# Patient Record
Sex: FEMALE | Race: WHITE | Employment: OTHER | URBAN - METROPOLITAN AREA
[De-identification: names, ages, dates, MRNs, and addresses within clinical notes are randomized per-mention and may not be internally consistent; named-entity substitution may affect disease eponyms.]

---

## 2021-06-02 ENCOUNTER — APPOINTMENT (OUTPATIENT)
Dept: CT IMAGING | Age: 86
End: 2021-06-02
Attending: EMERGENCY MEDICINE
Payer: MEDICARE

## 2021-06-02 ENCOUNTER — HOSPITAL ENCOUNTER (EMERGENCY)
Age: 86
Discharge: HOME | End: 2021-06-02
Attending: EMERGENCY MEDICINE
Payer: MEDICARE

## 2021-06-02 VITALS
HEART RATE: 79 BPM | WEIGHT: 181.44 LBS | HEIGHT: 62 IN | OXYGEN SATURATION: 95 % | RESPIRATION RATE: 19 BRPM | TEMPERATURE: 98.5 F | BODY MASS INDEX: 33.39 KG/M2 | SYSTOLIC BLOOD PRESSURE: 180 MMHG | DIASTOLIC BLOOD PRESSURE: 91 MMHG
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
## 2021-06-02 PROCEDURE — 99284 EMERGENCY DEPT VISIT MOD MDM: CPT

## 2021-06-02 PROCEDURE — 70450 CT HEAD/BRAIN W/O DYE: CPT

## 2021-06-02 NOTE — ED TRIAGE NOTES
TRIAGE NOTE: Patient arrived via EMS with c/o witnessed fall, no LOC. Patient is at baseline. Hx of dementia.

## 2021-06-02 NOTE — ED PROVIDER NOTES
40-year-old female history of hypertension, dementia presents by EMS from nursing home after a witnessed fall today. She had a ground-level slip and fall. She is reportedly at her baseline mental status with dementia. She is pleasantly confused and unable to give me any history. The history is provided by the EMS personnel and medical records. Fall  The accident occurred less than 1 hour ago. The fall occurred while walking. She fell from a height of ground level. There was no blood loss. The patient is experiencing no pain. She was ambulatory at the scene. There was no entrapment after the fall. There was no drug use involved in the accident. There was no alcohol use involved in the accident. The risk factors include dementia.          Past Medical History:   Diagnosis Date    DJD (degenerative joint disease), multiple sites 2009    Essential hypertension, benign 2009    Osteopenia 2009    Other ill-defined conditions(799.89)     tooth extracted due to infection- now resolved    Pure hypercholesterolemia 2009       Past Surgical History:   Procedure Laterality Date    ENDOSCOPY, COLON, DIAGNOSTIC  2009 Dr. Aurora Dominguez     Diverticulosis repeat 5 yrs    HX  SECTION      x 2    HX GYN      hysterecotomy,    HX HEENT      T&A         Family History:   Problem Relation Age of Onset    Heart Disease Father        Social History     Socioeconomic History    Marital status:      Spouse name: Not on file    Number of children: Not on file    Years of education: Not on file    Highest education level: Not on file   Occupational History    Not on file   Tobacco Use    Smoking status: Never Smoker    Smokeless tobacco: Never Used   Substance and Sexual Activity    Alcohol use: No    Drug use: No    Sexual activity: Not on file   Other Topics Concern    Not on file   Social History Narrative    Not on file     Social Determinants of Health     Financial Resource Strain:     Difficulty of Paying Living Expenses:    Food Insecurity:     Worried About Running Out of Food in the Last Year:     [address] in the Last Year:    Transportation Needs:     Lack of Transportation (Medical):  Lack of Transportation (Non-Medical):    Physical Activity:     Days of Exercise per Week:     Minutes of Exercise per Session:    Stress:     Feeling of Stress :    Social Connections:     Frequency of Communication with Friends and Family:     Frequency of Social Gatherings with Friends and Family:     Attends Pentec[employer]ism Services:     Active Member of Clubs or Organizations:     Attends Club or Organization Meetings:     Marital Status:    Intimate Partner Violence:     Fear of Current or Ex-Partner:     Emotionally Abused:     Physically Abused:     Sexually Abused: ALLERGIES: Tramadol    Review of Systems   Unable to perform ROS: Dementia       There were no vitals filed for this visit. Physical Exam  Vitals and nursing note reviewed. Constitutional:       General: She is not in acute distress. Appearance: Normal appearance. She is well-developed. She is not ill-appearing, toxic-appearing or diaphoretic. HENT:      Head: Normocephalic and atraumatic. Nose: Nose normal.      Mouth/Throat:      Mouth: Mucous membranes are moist.      Pharynx: Oropharynx is clear. Eyes:      Extraocular Movements: Extraocular movements intact. Conjunctiva/sclera: Conjunctivae normal.      Pupils: Pupils are equal, round, and reactive to light. Cardiovascular:      Rate and Rhythm: Normal rate and regular rhythm. Pulses: Normal pulses. Heart sounds: Normal heart sounds. Pulmonary:      Effort: Pulmonary effort is normal. No respiratory distress. Breath sounds: Normal breath sounds. No wheezing. Chest:      Chest wall: No tenderness. Abdominal:      General: Abdomen is flat. There is no distension. Palpations: Abdomen is soft. Tenderness: There is no abdominal tenderness. There is no guarding or rebound. Musculoskeletal:         General: No swelling, tenderness, deformity or signs of injury. Normal range of motion. Cervical back: Normal range of motion and neck supple. No rigidity. No muscular tenderness. Right lower leg: No edema. Left lower leg: No edema. Skin:     General: Skin is warm and dry. Capillary Refill: Capillary refill takes less than 2 seconds. Neurological:      General: No focal deficit present. Mental Status: She is alert. Mental status is at baseline. She is disoriented. Psychiatric:         Mood and Affect: Mood normal.         Behavior: Behavior normal.          MDM  Number of Diagnoses or Management Options  Diagnosis management comments: 77-year-old female presents as above after fall. No evidence of acute injury on exam or CT. Plan to discharge back to her nursing facility.        Amount and/or Complexity of Data Reviewed  Tests in the radiology section of CPT®: reviewed           Procedures

## 2021-06-02 NOTE — DISCHARGE INSTRUCTIONS
Thank you for allowing us to provide you with medical care today. We realize that you have many choices for your emergency care needs. We thank you for choosing [facility]. Please choose us in the future for any continued health care needs. We hope we addressed all of your medical concerns. We strive to provide excellent quality care in the Emergency Department. Anything less than excellent does not meet our expectations. The exam and treatment you received in the Emergency Department were for an emergent problem and are not intended as complete care. It is important that you follow up with a doctor, nurse practitioner, or physician's assistant for ongoing care. If your symptoms worsen or you do not improve as expected and you are unable to reach your usual health care provider, you should return to the Emergency Department. We are available 24 hours a day. Take this sheet with you when you go to your follow-up visit. If you have any problem arranging the follow-up visit, contact the Emergency Department immediately. Make an appointment your family doctor for follow up of this visit. Return to the ER if you are unable to be seen in a timely manner.

## 2021-11-24 ENCOUNTER — HOSPICE ADMISSION (OUTPATIENT)
Dept: HOSPICE | Facility: HOSPICE | Age: 86
End: 2021-11-24
Payer: MEDICARE

## 2021-11-25 ENCOUNTER — HOME CARE VISIT (OUTPATIENT)
Dept: HOSPICE | Facility: HOSPICE | Age: 86
End: 2021-11-25
Payer: MEDICARE

## 2021-11-25 ENCOUNTER — HOME CARE VISIT (OUTPATIENT)
Dept: SCHEDULING | Facility: HOME HEALTH | Age: 86
End: 2021-11-25
Payer: MEDICARE

## 2021-11-25 VITALS
BODY MASS INDEX: 31.05 KG/M2 | DIASTOLIC BLOOD PRESSURE: 50 MMHG | RESPIRATION RATE: 20 BRPM | HEART RATE: 110 BPM | WEIGHT: 169.75 LBS | SYSTOLIC BLOOD PRESSURE: 88 MMHG | OXYGEN SATURATION: 94 %

## 2021-11-25 PROCEDURE — G0299 HHS/HOSPICE OF RN EA 15 MIN: HCPCS

## 2021-11-25 PROCEDURE — HOSPICE MEDICATION HC HH HOSPICE MEDICATION

## 2021-11-25 PROCEDURE — 0651 HSPC ROUTINE HOME CARE

## 2021-11-25 PROCEDURE — 3336500001 HSPC ELECTION

## 2021-11-25 NOTE — HOSPICE
Patient Name: Octavio Mahoney    Date of Birth: [birth year]  Age: 80                                                         Principle Hospice Diagnosis: Alzheimer's   Diagnoses RELATED to the terminal prognosis: dysphagia  Other Diagnoses: recent urosepsis, a.fib, respiratory failure with hypoxia, hypertension      Date of Hospice Admission: 21  Hospice Attending Elected by Patient:  Berkeleydigna Shipman   spoke by phone/he will cover  Primary Care Physician:     Admitting RN: MARLEY  Nurse CM: HARDIK  : HARDIK  :    HARDIK      Durable DNR:     Home:    Direct Observation: Pt recently hospitalized with urosepsis. Continued functional decline, resp. failure, poor po intake and dysphagia and family declines any artificial food or fluid. Three sons present for admission, one lives locally, Pedro [place], two are out of state. Reviewed hospice philosophy, goals of care, symptom management. Long discussion about EOL signs. Consents were done via docusign by another team member. Pt is PPS 30, PAINAD zero, FAST 7E. Resp- increased work of breathing. Saturation 94% on 3 liters, loose, moist cough. CV-BP low, 88 SBP, skin warm to touch. Diminished pulses. GI/- Incontinent of bowel and bladder. Skin- no areas of concern, a few scattered bruises. Pt is bed fast, total care. Other- collaborated with HMD, Dr. Dora Serna, and nurse Marvin Ogden at facility. All po meds d/c due to dysphagia, pocketing. Comfort meds ordered and will come via [employer]. Electronic scripts to D.     ER Visits/ Hospitalizations in past year: one recent admit to [facility]   Onset Date of Hospice Diagnosis: 21  Summary of Disease Progression Leading to Hospice Diagnosis: from recent inpatient stay H/P:             Patients will be considered to be in the terminal stage of dementia (life expectancy of six months or less) if they meet the following criteria. Patients with dementia should show all the following characteristics:    _____7 E___  1. Stage seven or beyond according to the Functional Assessment Staging Scale;  ____x____  2. Unable to ambulate without assistance;  ____x____  3. Unable to dress without assistance;  ___x_____  4. Unable to bathe without assistance;  ___x_____  5. Urinary and fecal incontinence, intermittent or constant;  ___x_____  6. No consistently meaningful verbal communication: stereotypical phrases only or the ability             to speak is limited to six or fewer intelligible words. Patients should have had one of the following within the past 12 months:    ________  1. Aspiration pneumonia;  ____x____  2. Pyelonephritis or other upper urinary tract infection;  ____x____  3. Septicemia;  ________  4. Decubitus ulcers, multiple, stage 3-4;  ________  5. Fever, recurrent after antibiotics;  ____x____  6. Inability to maintain sufficient fluid and calorie intake with 10% weight loss during the            previous six months or serum albumin Note: This section is specific for Alzheimer's Disease            and related disorders, and is not appropriate for other types of dementia, such as multi-           infarct dementia. SPIRITUAL/Social/Emotional:              Patient graduated from [employer]. 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 Very involved in her Hindu life. Raised 3 sons. All sons present today. Psych/ Social/ Emotional Issues Identified:   Pt is a long term resident of [facility]. Lived in [facility] for 5 years then moved to memory care 2.5 years ago. Son Alex Mishra is MPOA. Dr. Giovana Leonard, contacted, agrees to serve as attending provider for hospice and provided verbal certification of terminal illness with prognosis of 6 months or less life expectancy. Orders for hospice admission, medications and plan of treatment received. Medication reconciliation completed.     Currently this patient has:  Supplemental O2:   3 liters          MEDS:  I have reviewed the patient's medication list with MD and identified the following:  Nonformulary medications:   Unrelated medications:  none   all meds covered, comfort meds only     IDT communication to include SN LYNN, SW, [address] [place] [address] and support team.

## 2021-11-26 ENCOUNTER — HOME CARE VISIT (OUTPATIENT)
Dept: SCHEDULING | Facility: HOME HEALTH | Age: 86
End: 2021-11-26
Payer: MEDICARE

## 2021-11-26 VITALS — TEMPERATURE: 98.3 F | RESPIRATION RATE: 20 BRPM | OXYGEN SATURATION: 95 % | HEART RATE: 75 BPM

## 2021-11-26 PROCEDURE — 0651 HSPC ROUTINE HOME CARE

## 2021-11-26 PROCEDURE — G0299 HHS/HOSPICE OF RN EA 15 MIN: HCPCS

## 2021-11-27 PROCEDURE — 0651 HSPC ROUTINE HOME CARE

## 2021-11-27 NOTE — HOSPICE
Visit made to assess patient post admission to hospice yesterday. Patient received in bed with 3 adult children at bedside, per facility staff patient has eaten 100 percent of all meals since admission. Patient currently appears well palliated, not short of breath, forehead smooth, expression calm. Reviewed with sons that patient will continue to receive comfort based care as that is their wish. Reviewed that visits will be made at least weekly and can be increased based on patient's symptoms. Currently, patient appears comfortable and is eating/drinking. Family in agreement that patient does not require visits over this weekend this weekend. Family would like nurse visiting to call after visits and communicate visit findings after each visit. Son's in agreement that Lawerance Confer is point person.

## 2021-11-28 PROCEDURE — 0651 HSPC ROUTINE HOME CARE

## 2021-11-29 ENCOUNTER — HOME CARE VISIT (OUTPATIENT)
Dept: SCHEDULING | Facility: HOME HEALTH | Age: 86
End: 2021-11-29
Payer: MEDICARE

## 2021-11-29 ENCOUNTER — HOME CARE VISIT (OUTPATIENT)
Dept: HOSPICE | Facility: HOSPICE | Age: 86
End: 2021-11-29
Payer: MEDICARE

## 2021-11-29 PROCEDURE — G0155 HHCP-SVS OF CSW,EA 15 MIN: HCPCS

## 2021-11-29 PROCEDURE — 0651 HSPC ROUTINE HOME CARE

## 2021-11-29 PROCEDURE — G0156 HHCP-SVS OF AIDE,EA 15 MIN: HCPCS

## 2021-11-29 NOTE — HOSPICE
LMSW met with patient bedside at [facility]. Patient just received her bath. Patient minimally engaged but able to answer yes and no questions some of the time. LMSW provided adapted socialization and played [place] hymns for patient. Patient smiled at end of visit and stated yes when asked if she was tired. LMSW spoke with facility  who stated that patient has a care plan meeting coming up. Facility  will invite LMSW to care plan meeting for continuity of care. LMSW called and spoke with ursula's son Danny Kiser. LMSW explained social work role. Danny Kiser stated that patient's other sons were in town last week as they live in [facility]. Danny Kiser stated that they thought the patient was imminent last week but upon return to [facility], patient has stabilized for the time being and is eating and has some interaction with them. Bill expressed appreciation for the additional time this stabilization is providing for family to see patient. LMSW provided validation of feeling and situation. LMSW inquired about  home. Danny Kiser stated that the family met with [facility] last week and will utilize their services when the time comes. Danny Kiser stated that the patient's spouse  9 years ago. LMSW discussed availability of music therapy for patient. Danny Kiser stated that he thought patient would enjoy and benefit from music therapy. LMSW will make referral. LMSW will continue to be available to address needs that arise.

## 2021-11-30 ENCOUNTER — HOME CARE VISIT (OUTPATIENT)
Dept: HOSPICE | Facility: HOSPICE | Age: 86
End: 2021-11-30
Payer: MEDICARE

## 2021-11-30 ENCOUNTER — HOME CARE VISIT (OUTPATIENT)
Dept: SCHEDULING | Facility: HOME HEALTH | Age: 86
End: 2021-11-30
Payer: MEDICARE

## 2021-11-30 PROCEDURE — 0651 HSPC ROUTINE HOME CARE

## 2021-11-30 PROCEDURE — G0299 HHS/HOSPICE OF RN EA 15 MIN: HCPCS

## 2021-12-01 VITALS
SYSTOLIC BLOOD PRESSURE: 130 MMHG | OXYGEN SATURATION: 18 % | RESPIRATION RATE: 18 BRPM | TEMPERATURE: 97.5 F | DIASTOLIC BLOOD PRESSURE: 80 MMHG | HEART RATE: 78 BPM

## 2021-12-01 PROCEDURE — 0651 HSPC ROUTINE HOME CARE

## 2021-12-01 NOTE — HOSPICE
Arrived at patients room in [facility] and she was comfortably sitting in her wheelchair watching tv. Staff uses Paolo Ruff for all transfers as she will not bear weight. Spoke with Keria at [facility] and they did not know when the last BM was. She states they will assess and medicate as needed. Sleep in good and she has been eating 100 % of meals per [address] DNR paperwork sent to Christi Betancourt to download into our system. 02 at 3liter per nasal canula being use continuously. No prn med use. No pain voiced and caregiver affirmed this observation. Called son Silvana Breen to update. Told to call hospice with questions, concerns or change in status. Med rec performed with Keira. Morphine in comfort pack has not yet been delivered. Vishal Renner who stated it would be sent out shortly.

## 2021-12-01 NOTE — HOSPICE
missed visit: no spiritual care needs expressed by patient or CG, nor have they reached out for support. Will contact patient/family to assess needs and introduce spiritual care services.

## 2021-12-01 NOTE — HOSPICE
Music Therapy Assessment  Date: 11/30/21      Music Preferences, Background: Traditional hymns and popular music from the 52's; artists like Marlise Hun Day. Session Observations: This music therapist eligible (MTE) attempted to provide a music therapy assessment, and learned the patient was in a hair appointment. MTE consulted with staff to see how long the pt's appointment would be, and to learn more information about her. Activies assistant responded to these, sharing about the pt's music preferences and background. MTE will attempt another assessment on 12/7.     Francia An, Music Therapist Eligible  [facility] Care Department

## 2021-12-02 ENCOUNTER — HOME CARE VISIT (OUTPATIENT)
Dept: SCHEDULING | Facility: HOME HEALTH | Age: 86
End: 2021-12-02
Payer: MEDICARE

## 2021-12-02 PROCEDURE — G0156 HHCP-SVS OF AIDE,EA 15 MIN: HCPCS

## 2021-12-02 PROCEDURE — 0651 HSPC ROUTINE HOME CARE

## 2021-12-03 PROCEDURE — 0651 HSPC ROUTINE HOME CARE

## 2021-12-04 PROCEDURE — 0651 HSPC ROUTINE HOME CARE

## 2021-12-05 PROCEDURE — 0651 HSPC ROUTINE HOME CARE

## 2021-12-06 ENCOUNTER — HOME CARE VISIT (OUTPATIENT)
Dept: SCHEDULING | Facility: HOME HEALTH | Age: 86
End: 2021-12-06
Payer: MEDICARE

## 2021-12-06 PROCEDURE — 0651 HSPC ROUTINE HOME CARE

## 2021-12-06 PROCEDURE — G0156 HHCP-SVS OF AIDE,EA 15 MIN: HCPCS

## 2021-12-07 ENCOUNTER — HOME CARE VISIT (OUTPATIENT)
Dept: SCHEDULING | Facility: HOME HEALTH | Age: 86
End: 2021-12-07
Payer: MEDICARE

## 2021-12-07 ENCOUNTER — HOME CARE VISIT (OUTPATIENT)
Dept: HOSPICE | Facility: HOSPICE | Age: 86
End: 2021-12-07
Payer: MEDICARE

## 2021-12-07 VITALS
TEMPERATURE: 98.5 F | RESPIRATION RATE: 22 BRPM | SYSTOLIC BLOOD PRESSURE: 122 MMHG | HEART RATE: 76 BPM | DIASTOLIC BLOOD PRESSURE: 80 MMHG

## 2021-12-07 PROCEDURE — 0651 HSPC ROUTINE HOME CARE

## 2021-12-07 PROCEDURE — G0299 HHS/HOSPICE OF RN EA 15 MIN: HCPCS

## 2021-12-07 NOTE — HOSPICE
Music Therapy Assessment    Name: Kristopher Quiñones   Language: English    Date: 12/7/2021    Mental Status:   [x] Alert [  ] Forgetful [x]  Confused  [  ] Minimally responsive  [  ] Sleeping    Communication Status: [  ] Impaired Speech [  ] Nonverbal -N/A    Physical Status:   [  ] Oxygen in use  [  ] Hard of Hearing [  ] Vision Impaired   [  ] Ambulatory  [  ] Ambulatory with assistance [  ] Non-ambulatory -N/A    Music Preferences, Background: Hymns, [place] songs, popular songs from the 52's; artists like Rosi Day    Clinical Problem addressed: Spiritual and psychosocial support     Goal(s) met in session:  Physical/Pain management (Scale of 1-10):    Pre-session rating: Pt couldn't report on this  Post-session rating: Pt couldn't report on this  [  ] Increased relaxation   [  ] Affected breathing patterns  [  ] Decreased muscle tension   [  ] Decreased agitation  [  ] Affected heart rate    [  ] Increased alertness     Emotional/Psychological:  [x] Increased self-expression   [  ] Decreased aggressive behavior   [  ] Decreased feelings of stress  [  ] Discussed healthy coping skills     [  ] Improved mood    [  ] Decreased withdrawn behavior     Social:  [  ] Decreased feelings of isolation/loneliness [x] Positive social interaction    [  ] Provided support and/or comfort for family/friends    Spiritual:  [x] Spiritual support    [  ] Expressed peace  [  ] Expressed harsh    [  ] Discussed beliefs    Techniques Utilized (Check all that apply):   [  ] Procedural support MT [  ] Music for relaxation [x] Patient preferred music  [  ] Benita analysis  [  ] Song choice  [x] Music for validation  [  ] Entrainment  [  ] Movement to music [  ] Guided visualization  [  ] Juan Pablo Phillips  [  ] Patient instrument playing [  ] Nancy Brito writing  [x] Sing along   [  ] Ismael Stiles  [  ] Sensory stimulation  [  ] Active Listening  [x] Music for spiritual support [  ] Making of CDs as gifts    Session Observations: MsMalina Alinda Peabody is a 81yo with a dx of Alzheimer's Disease. Pt was referred to music therapy for psychosocial support and was assessed on 12/7 by this writer. Pt was sitting in her wheelchair and presenting with confusion when this music therapist eligible (MTE) entered the room. MTE spoke the pt's name and introduced self. Pt quietly laughed and smiled in response to this. MTE sang and played Blessed Assurance with guitar at an upbeat tempo and invited the pt to sing along if she'd like. Pt's focus remained down towards her feet during this time, but her affect appeared to brighten in response to the music. MTE mentioned the cold weather and holidays, and pt mumbled in response to this, though it was hard to understand what she said. Pt also mentioned something involving [place], and MTE offered to sing and play a [place] song. She smiled as this MTE sang and played [facility] with guitar. During this time, pt was also receiving care from her RN (Vani). At the closing of the song, MTE attempted to ask the pt questions about her music preferences, but she did not respond to this. MTE mentioned learning the pt enjoys Rosi Day, and offered to play one of her songs. Pt briefly looked up and said which one while smiling. MTE began to sing and play [address] with guitar during this time. Pt appeared to increase self-expression for a brief moment . AEB slightly mouthing along with the words during the chorus. MTE provided words of encouragement in response to this, and played through the chorus 2 more times to offer an additional opportunity for the pt to further sing along. Pt appeared to close her eyes at this time. As this MTE offered another song, a staff member entered and shared she needed to attend lunch. MTE expressed understanding to this and concluded the session.

## 2021-12-07 NOTE — HOSPICE
Arrived to patients room and she was up in chair for music therapy. She seemed to be enjoying this as she smiled at times. Eating approx. 60% of meals which is a decrease from last week. Drinking fluids when offered by staff. No signs of aspiration at this time. o2 via nasal canula at 3 l with o2 concentrator except meals noted. Med rec completed with staff Nurse. Supplies ordered. Supervisor visit complete. Family updated with phone call. Chair and bed bound with use of alysha lift for all tranfers. No complaints's voiced. Bowel sounds active. Pulse 76 and regular. Respirations 22 and unlabored. No evident pain. Told to contact hospice with questions, concerns, or change in status.

## 2021-12-08 PROBLEM — Z51.5 HOSPICE CARE: Status: ACTIVE | Noted: 2021-01-01

## 2021-12-08 PROCEDURE — 0651 HSPC ROUTINE HOME CARE

## 2021-12-09 ENCOUNTER — HOME CARE VISIT (OUTPATIENT)
Dept: SCHEDULING | Facility: HOME HEALTH | Age: 86
End: 2021-12-09
Payer: MEDICARE

## 2021-12-09 ENCOUNTER — HOME CARE VISIT (OUTPATIENT)
Dept: HOSPICE | Facility: HOSPICE | Age: 86
End: 2021-12-09
Payer: MEDICARE

## 2021-12-09 PROCEDURE — 0651 HSPC ROUTINE HOME CARE

## 2021-12-09 PROCEDURE — G0155 HHCP-SVS OF CSW,EA 15 MIN: HCPCS

## 2021-12-09 NOTE — HOSPICE
LMSW conducted PRN visit in order to attend care plan meeting at [facility]. Present at care plan meeting were LMSW, JEFF Ludwig, facility , facility RN, facility dietician, facility , and patient's daughter in [place]. Patient's current plan of care and modified engagement discussed as well as patient's transition to hospice services. Patient receives music therapy and  support as well. LMSW attempted visit with patient for socialization after care plan meeting. Patient had flat affect and answered yes to if she was \"hurting\" but did not answer when follow up questions were asked. LMSW consulted with facility RN and hospice RN. LMSW will make routine visit for adapted socialization tomorrow along with .

## 2021-12-10 ENCOUNTER — HOME CARE VISIT (OUTPATIENT)
Dept: SCHEDULING | Facility: HOME HEALTH | Age: 86
End: 2021-12-10
Payer: MEDICARE

## 2021-12-10 ENCOUNTER — HOME CARE VISIT (OUTPATIENT)
Dept: HOSPICE | Facility: HOSPICE | Age: 86
End: 2021-12-10
Payer: MEDICARE

## 2021-12-10 PROCEDURE — G0155 HHCP-SVS OF CSW,EA 15 MIN: HCPCS

## 2021-12-10 PROCEDURE — 0651 HSPC ROUTINE HOME CARE

## 2021-12-10 NOTE — HOSPICE
TONO and Roshan Payne met with patient on joint visit. Patient was near nurses station at start of visit. LMSW assisted patient back to her room. TONO and Roshan Payne provided music-based socialization through singing of [place] steel. Patient engaged some with noticeable smile and few words of affirmation. Patient would close her eyes at times during the visit. Toward end of visit patient became visibly tired. LMSW provided validation of situation. LMSW and  checked in at nurses station and facility staff reported no new needs. LMSW called and left a voicemail with patient's on Bill offering a report of visit with call back. LMSW will continue to be available to address needs that arise.

## 2021-12-10 NOTE — HOSPICE
Pts CNA on the floor felice , stated she told  the person who called to inform them that the her regular aide was out,and  that her bath days where Tues and Thurs .

## 2021-12-11 PROCEDURE — 0651 HSPC ROUTINE HOME CARE

## 2021-12-11 NOTE — HOSPICE
initial visit w/ Jerrica Gardner to provide comfort and spiritual guidance to the pt, family and CG as they make their journey towards EOL. Introduced spiritual care services to patient. Engaged in music: played guitar and sang familiar [place] songs validating patient's emotions. Explored the Episcopalian theme of peace in the midst of life's trials by reflecting on the history of \"Silent Night. \" During visit, patient became more relaxed and began to verbalize or smile more as music progressed.     Danielle et al. Assessing Spiritual Concerns in Palliative Care (Completed 12/10/22)  Need for Meaning in the Face of Sufferin (patient at times tearful and struggles to engage others)  Need for integrity, legacy, generativity: 0* (patient postively responds to music, but unable to engage in life review due to cognitive impairment)  Concerns about relationships: family and/or significant others: 0*  Concern or fear of dying or death: 0*  Issues related to making decisions about treatment: 0   R/S Struggle: 2 (patient is [employer]; feels alienated from formerly meaningful connections with Orthodoxy institutions or leaders)  0--no evidence of unmet need; (0*--further assessment needed to clarify needs)  1--some evidence of unmet need  2--substantial evidence of unmet need 3--evidence of severe unmet need  Total Spiritual Distress Score: TBD

## 2021-12-12 PROCEDURE — 0651 HSPC ROUTINE HOME CARE

## 2021-12-13 ENCOUNTER — HOME CARE VISIT (OUTPATIENT)
Dept: SCHEDULING | Facility: HOME HEALTH | Age: 86
End: 2021-12-13
Payer: MEDICARE

## 2021-12-13 ENCOUNTER — HOME CARE VISIT (OUTPATIENT)
Dept: HOSPICE | Facility: HOSPICE | Age: 86
End: 2021-12-13
Payer: MEDICARE

## 2021-12-13 PROCEDURE — 0651 HSPC ROUTINE HOME CARE

## 2021-12-13 PROCEDURE — G0156 HHCP-SVS OF AIDE,EA 15 MIN: HCPCS

## 2021-12-13 PROCEDURE — G0299 HHS/HOSPICE OF RN EA 15 MIN: HCPCS

## 2021-12-14 VITALS
HEART RATE: 86 BPM | SYSTOLIC BLOOD PRESSURE: 127 MMHG | TEMPERATURE: 97.6 F | RESPIRATION RATE: 18 BRPM | DIASTOLIC BLOOD PRESSURE: 80 MMHG

## 2021-12-14 PROCEDURE — 0651 HSPC ROUTINE HOME CARE

## 2021-12-14 NOTE — HOSPICE
Patient up in chair in dining room upon arrival.  Eating smaller amounts than she ate last week. Caregiver did not know when last bowel movent was. Spoke with assigned nurse Brad Lentz and asked to see if they could follow up and administer dulcolax if she has not had a bowel movement within 3 days. Respirations 18 even and unlabored. Pulse regular 86. NO complaints. Mood was upbeat as evidenced by smiling and laughing with staff. No orientation noted. Bowel sound active. Med rec completed. Message left with Wayne Momin regarding med order. Awaiting reply. Assigned nurse did not feel med rec was necessary nor did she have time(Estefania) Spoke with UGO and other nurse on floor allowed me to perform med rec. Supplies ordered. Skin intact. NO pain noted. Wearing o2 when not at meals at 3 liters via nasal canula. .  Patient has had no falls and is chair/bedfast.  Told staff to call with any concerns, change in status and questions.

## 2021-12-15 PROCEDURE — 0651 HSPC ROUTINE HOME CARE

## 2021-12-16 ENCOUNTER — HOME CARE VISIT (OUTPATIENT)
Dept: SCHEDULING | Facility: HOME HEALTH | Age: 86
End: 2021-12-16
Payer: MEDICARE

## 2021-12-16 PROCEDURE — 0651 HSPC ROUTINE HOME CARE

## 2021-12-16 PROCEDURE — G0156 HHCP-SVS OF AIDE,EA 15 MIN: HCPCS

## 2021-12-17 PROCEDURE — 0651 HSPC ROUTINE HOME CARE

## 2021-12-18 PROCEDURE — 0651 HSPC ROUTINE HOME CARE

## 2021-12-19 PROCEDURE — 0651 HSPC ROUTINE HOME CARE

## 2021-12-20 ENCOUNTER — HOME CARE VISIT (OUTPATIENT)
Dept: SCHEDULING | Facility: HOME HEALTH | Age: 86
End: 2021-12-20
Payer: MEDICARE

## 2021-12-20 PROCEDURE — G0156 HHCP-SVS OF AIDE,EA 15 MIN: HCPCS

## 2021-12-20 PROCEDURE — 0651 HSPC ROUTINE HOME CARE

## 2021-12-21 ENCOUNTER — HOME CARE VISIT (OUTPATIENT)
Dept: SCHEDULING | Facility: HOME HEALTH | Age: 86
End: 2021-12-21
Payer: MEDICARE

## 2021-12-21 VITALS — TEMPERATURE: 97.2 F | HEART RATE: 72 BPM | DIASTOLIC BLOOD PRESSURE: 80 MMHG | SYSTOLIC BLOOD PRESSURE: 132 MMHG

## 2021-12-21 PROCEDURE — G0299 HHS/HOSPICE OF RN EA 15 MIN: HCPCS

## 2021-12-21 PROCEDURE — 0651 HSPC ROUTINE HOME CARE

## 2021-12-21 NOTE — HOSPICE
Arrived on the unit and patient was being changed. She was alert to name and laughing at times. She had a BM this am. Skin is clean dry and intact. Max assist with alysha lift to transfer to her wheelchair. Met with Scotty Carter, and patient is eating puree diet with a honey consistency. Order received for Diet from Kameron Lombardo NP and given to nurse on staff. Respirations were 18 an unlabored. Heart rate 72 and regular. Patient is eating a \"little less' than normal and doesn't want to eat at times as stated by Aide on duty. NO supplies needed and medication rec completed. Called son Swapna Brush and discussed diet with him. No pain noted. O2 at 3 liters per nasal cannula when not eating. Told to call hospice with any changes in status, concerns or questions.

## 2021-12-22 PROCEDURE — 0651 HSPC ROUTINE HOME CARE

## 2021-12-23 ENCOUNTER — HOME CARE VISIT (OUTPATIENT)
Dept: SCHEDULING | Facility: HOME HEALTH | Age: 86
End: 2021-12-23
Payer: MEDICARE

## 2021-12-23 PROCEDURE — G0156 HHCP-SVS OF AIDE,EA 15 MIN: HCPCS

## 2021-12-23 PROCEDURE — 0651 HSPC ROUTINE HOME CARE

## 2021-12-24 PROCEDURE — 0651 HSPC ROUTINE HOME CARE

## 2021-12-25 PROCEDURE — 0651 HSPC ROUTINE HOME CARE

## 2021-12-26 PROCEDURE — 0651 HSPC ROUTINE HOME CARE

## 2021-12-27 ENCOUNTER — HOME CARE VISIT (OUTPATIENT)
Dept: SCHEDULING | Facility: HOME HEALTH | Age: 86
End: 2021-12-27
Payer: MEDICARE

## 2021-12-27 PROCEDURE — G0156 HHCP-SVS OF AIDE,EA 15 MIN: HCPCS

## 2021-12-27 PROCEDURE — 0651 HSPC ROUTINE HOME CARE

## 2021-12-28 ENCOUNTER — HOME CARE VISIT (OUTPATIENT)
Dept: SCHEDULING | Facility: HOME HEALTH | Age: 86
End: 2021-12-28
Payer: MEDICARE

## 2021-12-28 ENCOUNTER — HOME CARE VISIT (OUTPATIENT)
Dept: HOSPICE | Facility: HOSPICE | Age: 86
End: 2021-12-28
Payer: MEDICARE

## 2021-12-28 PROCEDURE — G0299 HHS/HOSPICE OF RN EA 15 MIN: HCPCS

## 2021-12-28 PROCEDURE — 0651 HSPC ROUTINE HOME CARE

## 2021-12-29 ENCOUNTER — HOME CARE VISIT (OUTPATIENT)
Dept: HOSPICE | Facility: HOSPICE | Age: 86
End: 2021-12-29
Payer: MEDICARE

## 2021-12-29 VITALS
SYSTOLIC BLOOD PRESSURE: 132 MMHG | RESPIRATION RATE: 18 BRPM | HEART RATE: 69 BPM | TEMPERATURE: 97.7 F | DIASTOLIC BLOOD PRESSURE: 82 MMHG

## 2021-12-29 PROCEDURE — 0651 HSPC ROUTINE HOME CARE

## 2021-12-29 NOTE — HOSPICE
Arrived to patient room and she was sitting up watching TV. Alert to name and laughing at times. Last BM 12/26/21. Eating improved this week as stated by [facility] staff. Respirations 18 and unlabored. o2 on via nasal canula at 3 l intact. Pulse 69 an regular. Skin is clean dry and intact. Heels were elevated. Med rec completed with Kourtney Duron. No medications needs at this time. No supplies needed at this time. NO falls since last visit. Naomi Elyse lift transfers continue to wheelchair. Called son to give an update report. Told to contact hospice with questions, concerns or changes in status.

## 2021-12-30 PROCEDURE — 0651 HSPC ROUTINE HOME CARE

## 2021-12-31 PROCEDURE — 0651 HSPC ROUTINE HOME CARE

## 2022-01-01 ENCOUNTER — HOME CARE VISIT (OUTPATIENT)
Dept: SCHEDULING | Facility: HOME HEALTH | Age: 87
End: 2022-01-01
Payer: MEDICARE

## 2022-01-01 ENCOUNTER — HOME CARE VISIT (OUTPATIENT)
Dept: HOSPICE | Facility: HOSPICE | Age: 87
End: 2022-01-01
Payer: MEDICARE

## 2022-01-01 VITALS
SYSTOLIC BLOOD PRESSURE: 102 MMHG | HEART RATE: 68 BPM | RESPIRATION RATE: 18 BRPM | TEMPERATURE: 98.2 F | DIASTOLIC BLOOD PRESSURE: 62 MMHG

## 2022-01-01 VITALS
TEMPERATURE: 98.6 F | RESPIRATION RATE: 16 BRPM | HEART RATE: 72 BPM | DIASTOLIC BLOOD PRESSURE: 84 MMHG | SYSTOLIC BLOOD PRESSURE: 128 MMHG

## 2022-01-01 PROCEDURE — G0156 HHCP-SVS OF AIDE,EA 15 MIN: HCPCS

## 2022-01-01 PROCEDURE — G0299 HHS/HOSPICE OF RN EA 15 MIN: HCPCS

## 2022-01-01 PROCEDURE — 0651 HSPC ROUTINE HOME CARE

## 2022-01-02 PROCEDURE — 0651 HSPC ROUTINE HOME CARE

## 2022-01-03 PROCEDURE — 0651 HSPC ROUTINE HOME CARE

## 2022-01-04 PROCEDURE — 0651 HSPC ROUTINE HOME CARE

## 2022-01-05 ENCOUNTER — HOME CARE VISIT (OUTPATIENT)
Dept: SCHEDULING | Facility: HOME HEALTH | Age: 87
End: 2022-01-05
Payer: MEDICARE

## 2022-01-05 VITALS
HEART RATE: 69 BPM | TEMPERATURE: 97.2 F | DIASTOLIC BLOOD PRESSURE: 82 MMHG | RESPIRATION RATE: 20 BRPM | SYSTOLIC BLOOD PRESSURE: 140 MMHG

## 2022-01-05 PROCEDURE — G0299 HHS/HOSPICE OF RN EA 15 MIN: HCPCS

## 2022-01-05 PROCEDURE — 0651 HSPC ROUTINE HOME CARE

## 2022-01-05 PROCEDURE — HOSPICE MEDICATION HC HH HOSPICE MEDICATION

## 2022-01-05 NOTE — HOSPICE
Arrived to patients room and she was up dressed neatly watching [employer]. Aide in to bring to dining area. Max assist of 2 people with transfer. Last Bm [id number]. Respirations 20 and unlabored. o2 via nasal canula at 3 liters when not eating. Pulse 69 and regular. No distress noted. Appetite is decreasing. Nursing staff stating she is drinking fluids well at a honey consistency. Discharge noted from vaginal area that is brown with a greenish tint and odorous. Called Raul Nuñez NP and patient was ordered diflucan 100 mg bid for 5 days. Order written and handed to nurse on unit. Son Janette Thurman called and updated on status. med rec completed. Supplies ordered. Told to call with questions, concerns or change in status.

## 2022-01-06 PROCEDURE — 0651 HSPC ROUTINE HOME CARE

## 2022-01-07 PROCEDURE — 0651 HSPC ROUTINE HOME CARE

## 2022-01-07 PROCEDURE — HOSPICE MEDICATION HC HH HOSPICE MEDICATION

## 2022-01-08 PROCEDURE — 0651 HSPC ROUTINE HOME CARE

## 2022-01-09 PROCEDURE — 0651 HSPC ROUTINE HOME CARE

## 2022-01-10 ENCOUNTER — HOME CARE VISIT (OUTPATIENT)
Dept: SCHEDULING | Facility: HOME HEALTH | Age: 87
End: 2022-01-10
Payer: MEDICARE

## 2022-01-10 VITALS
DIASTOLIC BLOOD PRESSURE: 80 MMHG | SYSTOLIC BLOOD PRESSURE: 140 MMHG | RESPIRATION RATE: 16 BRPM | HEART RATE: 78 BPM | TEMPERATURE: 97.2 F

## 2022-01-10 PROCEDURE — G0156 HHCP-SVS OF AIDE,EA 15 MIN: HCPCS

## 2022-01-10 PROCEDURE — 0651 HSPC ROUTINE HOME CARE

## 2022-01-10 NOTE — HOSPICE
Arrived to unit and Unit Aide had completed am care. Performed face and hair care and applied lotions to arms and legs. Patient required maximal assist with all Activities of daily living. Patient is up in wheelchair and positioned comfortably.

## 2022-01-11 ENCOUNTER — HOME CARE VISIT (OUTPATIENT)
Dept: SCHEDULING | Facility: HOME HEALTH | Age: 87
End: 2022-01-11
Payer: MEDICARE

## 2022-01-11 ENCOUNTER — HOME CARE VISIT (OUTPATIENT)
Dept: HOSPICE | Facility: HOSPICE | Age: 87
End: 2022-01-11
Payer: MEDICARE

## 2022-01-11 PROCEDURE — 0651 HSPC ROUTINE HOME CARE

## 2022-01-11 PROCEDURE — G0155 HHCP-SVS OF CSW,EA 15 MIN: HCPCS

## 2022-01-11 NOTE — HOSPICE
LMSW and Brooklyn Ospina met with patient and provided music-based socialization through singing of hymns. Patient was minimally engaging at first, but throughout visit patient became more expressive and opened her eyes along with smiling at familiar hymns. LMSW checked in with facility staff who reported no concerns. LMSW called and spoke with patient's son Esequiel Faith offering update of visit. Esequiel Faith stated that in his visits he has noticed that the patinet will respond to her name but otherwise is not as interactive. LMSW provided validation of feeling, role, and situation. LMSW encouraged a call to hosipce with any needs. LMSW will continue to be available to address needs that arise.

## 2022-01-12 ENCOUNTER — HOME CARE VISIT (OUTPATIENT)
Dept: HOSPICE | Facility: HOSPICE | Age: 87
End: 2022-01-12
Payer: MEDICARE

## 2022-01-12 PROCEDURE — 0651 HSPC ROUTINE HOME CARE

## 2022-01-12 PROCEDURE — G0299 HHS/HOSPICE OF RN EA 15 MIN: HCPCS

## 2022-01-13 VITALS
TEMPERATURE: 97.3 F | HEART RATE: 75 BPM | DIASTOLIC BLOOD PRESSURE: 76 MMHG | RESPIRATION RATE: 16 BRPM | SYSTOLIC BLOOD PRESSURE: 138 MMHG

## 2022-01-13 PROCEDURE — 0651 HSPC ROUTINE HOME CARE

## 2022-01-13 NOTE — HOSPICE
Arrived to patient room and she was neatly groomed sitting in wheelchair napping. Patient responds with name. Patient rouses easily with verbal stimuli and light touch. Appetite has been good and doing well with honey consistency liquids. Maintaining MAC at 25. She requires max assist with ADL's. Diflucan complete and nurse on unit does not see any documentation on continued discharge from vaginal area. Respirations were 16 and unlabored. Wearing  o2 at 3 liters when not eating via nasal canula. Lung fields clear throughout. Last Bowel Movement 1/12/22. Heart rate 76 and regular. No edema noted, pulses palpable. No nonverbal signs of pain, shortness of breath or agitation. Skin intact and well moisturized. Patient is up to chair daily. Medication reconciled with caregiver. Supplies ordered on 011022. Family/Caregiver aware of oxygen safety. Safety education related to aspiration, skin care and medication administration performed and reinforced . Told to contact hospice with questions, concerns or change in status. Update given to son Lisy Quispe.

## 2022-01-14 ENCOUNTER — HOME CARE VISIT (OUTPATIENT)
Dept: SCHEDULING | Facility: HOME HEALTH | Age: 87
End: 2022-01-14
Payer: MEDICARE

## 2022-01-14 PROCEDURE — 0651 HSPC ROUTINE HOME CARE

## 2022-01-14 PROCEDURE — G0156 HHCP-SVS OF AIDE,EA 15 MIN: HCPCS

## 2022-01-14 NOTE — HOSPICE
Routine follow up visit w/ LCSW to provide comfort and spiritual guidance to the patient, family and caregiver as they make their journey towards end of life. Upon arrival, patient was somewhat tired and disengaged with eyes closed sitting in her wheelchair. Facilitated music w/ familiar hymns and sung prayers affirming patient's background as a [employer] 's wife. As music continued, patient became more alert, smiling and verbalizing some.     Danielle et al. Assessing Spiritual Concerns in Palliative Care (Updated 22)   Need for Meaning in the Face of Sufferin (patient at times tearful and struggles to engage others)   Need for integrity, legacy, generativity: 0* (patient postively responds to music, but unable to engage in life review due to cognitive impairment)   Concerns about relationships: family and/or significant others: 0*   Concern or fear of dying or death: 0*   Issues related to making decisions about treatment: 0   R/S Struggle: 2 (patient is [facility] and her  was a ; feels alienated from formerly meaningful connections with [employer] institutions or leaders)   0--no evidence of unmet need; (0*--further assessment needed to clarify needs) 1--some evidence of unmet need 2--substantial evidence of unmet need 3--evidence of severe unmet need   Total Spiritual Distress Score: 4/18 (low)

## 2022-01-15 PROCEDURE — 0651 HSPC ROUTINE HOME CARE

## 2022-01-16 PROCEDURE — 0651 HSPC ROUTINE HOME CARE

## 2022-01-17 ENCOUNTER — HOME CARE VISIT (OUTPATIENT)
Dept: SCHEDULING | Facility: HOME HEALTH | Age: 87
End: 2022-01-17
Payer: MEDICARE

## 2022-01-17 PROCEDURE — G0156 HHCP-SVS OF AIDE,EA 15 MIN: HCPCS

## 2022-01-17 PROCEDURE — 0651 HSPC ROUTINE HOME CARE

## 2022-01-18 ENCOUNTER — HOME CARE VISIT (OUTPATIENT)
Dept: HOSPICE | Facility: HOSPICE | Age: 87
End: 2022-01-18
Payer: MEDICARE

## 2022-01-18 PROCEDURE — 0651 HSPC ROUTINE HOME CARE

## 2022-01-18 NOTE — HOSPICE
Music Therapy Visit    Date: 1/18/2022    Mental Status:   [x] Alert [  ] Forgetful [x]  Confused  [  ] Minimally responsive  [  ] Sleeping    Communication Status: [  ] Impaired Speech [  ] Nonverbal -N/A    Physical Status:   [x] Oxygen in use  [  ] Hard of Hearing [  ] Vision Impaired   [  ] Ambulatory  [  ] Ambulatory with assistance [  ] Non-ambulatory     Music Preferences, Background:Traditional hymns     Clinical Problem addressed: Spiritual support and psychosocial support    Goal(s) met in session:  Physical/Pain management (Scale of 1-10):    Pre-session rating: Pt didn't report on this  Post-session rating: Pt didn't report on this   [x] Increased relaxation   [  ] Affected breathing patterns  [  ] Decreased muscle tension   [  ] Decreased agitation  [  ] Affected heart rate   [  ] Increased alertness     Emotional/Psychological:  [  ] Increased self-expression  [  ] Decreased aggressive behavior   [  ] Decreased feelings of stress  [  ] Discussed healthy coping skills     [  ] Improved mood   [x] Decreased withdrawn behavior     Social:  [  ] Decreased feelings of isolation/loneliness [x] Positive social interaction    [  ] Provided support and/or comfort for family/friends    Spiritual:  [x] Spiritual support    [  ] Expressed peace  [  ] Expressed harsh    [  ] Discussed beliefs    Techniques Utilized (Check all that apply):   [  ] Procedural support MT [  ] Music for relaxation [  ] Patient preferred music  [  ] Benita analysis  [  ] Song choice  [x] Music for validation  [  ] Entrainment  [  ] Movement to music [  ] Guided visualization  [  ] Romulo Sports  [  ] Patient instrument playing [  ] Elizabeth Freitas writing  [  ] Marion Schwarz along   [  ] Indu Romeroion  [  ] Sensory stimulation  [  ] Active Listening  [x] Music for spiritual support [  ] Making of CDs as gifts     Session Observations:  Patient (pt) was alert, lying in bed and watching TV when this music therapist eligible (MTE) greeted the pt.  She smiled and giggled as this MTE sat at bedside and offered to play her music. MTE sang and played various traditional hymns with guitar throughout the session, and attempted to engage the pt in conversation. Pt did not respond to these, and appeared to increase relaxation in repsosne to the music as evidenced by (AEB) falling asleep. She awoke to this MTE speaking her name, and smiled. MTE offered to play her another song and she closed her eyes in response to this. MTE assessed singing another song would not cause harm, and sang and played Raji Loves Me, adjusting the lyrics to assist in increasing the pt's alertness (\"Raji loves Tod Bars, yes I know. .. \"). She opened her eyes and appeared to stare at a picture on her wall. MTE attempted to ask her questions about this, but she did not repsond. MTE offered to play her another song, and she repsonded \"no\" to this. MTE assessed that was enough music at this time, and wanted to respect the pt's space.  MTE exited during this time    Morteza Peterson, Music therapist Eligible   Spiritual Care Department   Referral Based Services

## 2022-01-19 ENCOUNTER — HOME VISIT (OUTPATIENT)
Dept: PALLATIVE CARE | Age: 87
End: 2022-01-19

## 2022-01-19 ENCOUNTER — HOME CARE VISIT (OUTPATIENT)
Dept: SCHEDULING | Facility: HOME HEALTH | Age: 87
End: 2022-01-19
Payer: MEDICARE

## 2022-01-19 VITALS
TEMPERATURE: 98.4 F | RESPIRATION RATE: 16 BRPM | HEART RATE: 76 BPM | SYSTOLIC BLOOD PRESSURE: 128 MMHG | DIASTOLIC BLOOD PRESSURE: 72 MMHG

## 2022-01-19 PROCEDURE — G0299 HHS/HOSPICE OF RN EA 15 MIN: HCPCS

## 2022-01-19 PROCEDURE — 0651 HSPC ROUTINE HOME CARE

## 2022-01-20 ENCOUNTER — HOME CARE VISIT (OUTPATIENT)
Dept: SCHEDULING | Facility: HOME HEALTH | Age: 87
End: 2022-01-20
Payer: MEDICARE

## 2022-01-20 PROCEDURE — G0156 HHCP-SVS OF AIDE,EA 15 MIN: HCPCS

## 2022-01-20 PROCEDURE — 0651 HSPC ROUTINE HOME CARE

## 2022-01-20 PROCEDURE — HOSPICE MEDICATION HC HH HOSPICE MEDICATION

## 2022-01-20 NOTE — PROGRESS NOTES
HISTORY OF PRESENT ILLNESS  Davi Odell is a 80 y.o. female. Came to meet her as a newer hospice patient and to assess foul-smelling vaginal discharge. She has a h/o senile degeneration of the brain, HTN, CKD, weight loss. Patient is eating poorly and declining in function and cognition. Patient has been having foul smelling vaginal discharge. It was thick and yellow and diflucan was ordered and now it is thin but the odor still fills the room. No stool seen in discharge. Patient does not scratch at area or seem bothered by it. Review of Systems   Unable to perform ROS: Dementia       Physical Exam  Vitals reviewed. Constitutional:       General: She is not in acute distress. Appearance: She is not ill-appearing. Comments: Confused, says a few words that are nonsensical, social smile, cooperative. HENT:      Head: Normocephalic and atraumatic. Right Ear: External ear normal.      Left Ear: External ear normal.      Nose: Nose normal.      Mouth/Throat:      Mouth: Mucous membranes are moist.   Eyes:      General: No scleral icterus. Right eye: No discharge. Left eye: No discharge. Conjunctiva/sclera: Conjunctivae normal.   Cardiovascular:      Rate and Rhythm: Normal rate and regular rhythm. Heart sounds: Normal heart sounds. No murmur heard. No friction rub. No gallop. Pulmonary:      Effort: Pulmonary effort is normal. No respiratory distress. Breath sounds: Normal breath sounds. No wheezing, rhonchi or rales. Abdominal:      General: Abdomen is flat. Bowel sounds are normal. There is no distension. Palpations: Abdomen is soft. There is no mass. Tenderness: There is no abdominal tenderness. There is no rebound. Genitourinary:     General: Normal vulva. Comments: Odor present in doorway and much stronger when legs opened by staff. Odor is very acrid with a foul quality to it. Copious clear vaginal discharge.   Vagina atrophied and even tip of 5th finger cannot penetrate for vaginal exam.  Patient has no visible FB on spreading of labia. Musculoskeletal:         General: No swelling. Right lower leg: No edema. Left lower leg: No edema. Lymphadenopathy:      Cervical: No cervical adenopathy. Skin:     General: Skin is warm and dry. Capillary Refill: Capillary refill takes 2 to 3 seconds. Coloration: Skin is pale. Skin is not jaundiced. Findings: No rash. Neurological:      Mental Status: She is alert. Comments: Oriented X 0   Psychiatric:      Comments: Says a few nonsensical words  No anxiety  Social smile  Laughs frequently  Cooperative and no aggression         ASSESSMENT and PLAN   1. Vaginal discharge, foul odor. After treating with diflucan, the thick discharge is gone and patient has copious watery discharge that is very foul smelling. It is clear. Color on diaper is light yellow. No odor of stool, no solids. Unable to perform vaginal exam secondary to severe atrophy. So foreign body unlikely to be causing discharge. Will treat as if BV. Patient cannot swallow pills and flagyl cannot be crushed. Discussed ordered liquid flagyl with Dr. Kylie Myers and order will be placed by  for 500 mg po bid X 7 days. Have ordered prn zofran with it as it is likely to cause nausea. 2.  Alzheimer's type dementia, advanced. Continue hospice and supportive care. Tanisha Ross.  NOEL Muse

## 2022-01-20 NOTE — HOSPICE
Arrived to patients room and she was up in chair resting. Oriented to name. Chair and bed fast. 2 person max assist to bed for and exam by Tommie Deluca NP due to thin yellow vaginal discharge with foul odor. Full exam was not able to be performed due to vaginal atrophy. Orders to treat the vaginal discharge were written for Flagyl liquid 500 mg bid for 7 days and Zofran ODT 4 mg Q 4 prn nausea. Patient tolerated assessment well. /72 pulse 76 and regular, respirations 16 and nonlabored. Lung fields clear. Temp 98.4. Appetite remains fair and tolerating honey thickened liquids well. Max assist with all Activities of Daily Living. NO edema noted. Med rec completed. O2 via nasal canula used while not eating via o2 compressor. O2 safety education completed. Told to contact hospice with questions, concerns or changes in status.     NEW MEDICATION INITIATION DOCUMENTATION:  Consulted AT MD to report change in patient status: YES  Obtained Order from Provider for initiation of Symptom relief medication / other medication needed:YES   Documentation completed in Telephone/Visit Note in [facility]  Reason medication is being initiated: Compa Terrazas MD / Provider name consulted re: change in status / initiation of new medication:YES  New Symptom(s): Sangeeta Quinjuan carlos  New Order(s): FLAGYL LIQUID 500 MG BID X 7 DAYS  Name of person being taught: FAMILY FACILITY STAFF  Instructions given: YES  Side Effects taught:YES  Response to teachin [address]:  Consulted AT MD to report change in patient status: YES  Obtained Order from Provider for initiation of Symptom relief medication / other medication needed:YES   Documentation completed in Telephone/Visit Note in [facility]  Reason medication is being initiated: Margene Brunner MD / Provider name consulted re: change in status / initiation of new medication:ASH DURANT NP  New Symptom(s): VAGINAL DISCHARGE  New Order(s): ZOFRAN ODT 4MG Q 4 PRN  Name of person being taught: FAMILY/CAREGIVER  Instructions given: YES  Side Effects taught:YES  Response to teaching: UNDERSTOOD

## 2022-01-21 PROCEDURE — HOSPICE MEDICATION HC HH HOSPICE MEDICATION

## 2022-01-21 PROCEDURE — 0651 HSPC ROUTINE HOME CARE

## 2022-01-22 PROCEDURE — 0651 HSPC ROUTINE HOME CARE

## 2022-01-23 PROCEDURE — 0651 HSPC ROUTINE HOME CARE

## 2022-01-24 ENCOUNTER — HOME CARE VISIT (OUTPATIENT)
Dept: SCHEDULING | Facility: HOME HEALTH | Age: 87
End: 2022-01-24
Payer: MEDICARE

## 2022-01-24 VITALS
DIASTOLIC BLOOD PRESSURE: 80 MMHG | RESPIRATION RATE: 16 BRPM | HEART RATE: 70 BPM | SYSTOLIC BLOOD PRESSURE: 138 MMHG | TEMPERATURE: 97.2 F

## 2022-01-24 PROCEDURE — 0651 HSPC ROUTINE HOME CARE

## 2022-01-24 PROCEDURE — G0156 HHCP-SVS OF AIDE,EA 15 MIN: HCPCS

## 2022-01-24 PROCEDURE — G0299 HHS/HOSPICE OF RN EA 15 MIN: HCPCS

## 2022-01-24 NOTE — HOSPICE
Arrived at the patients room while she was having her morning care. Supervisory visit complete. Eating 75% of breakfast with no signs of aspiration. No vaginal discharge today or foul odor. Patient is taking Flagyl as directed. Med rec completed with unit staff. No complaints voiced. Skin clean dry and intact. Well moisturized. Bm today. Bowel sounds active. Respirations 16 and nonlabored. Pulse 70 and regular. NO nonverbal signs of pain, shortness of breath or agitiation. No edema. 02 on except when eating at 3 liters via nasal canula. Oriented to name. Total assist and bed/chair bound. Supplies ordered. Told to call Hospice with questions, concerns, or changes in status.

## 2022-01-25 PROCEDURE — 0651 HSPC ROUTINE HOME CARE

## 2022-01-26 PROCEDURE — 0651 HSPC ROUTINE HOME CARE

## 2022-01-27 PROCEDURE — 0651 HSPC ROUTINE HOME CARE

## 2022-01-28 ENCOUNTER — HOME CARE VISIT (OUTPATIENT)
Dept: SCHEDULING | Facility: HOME HEALTH | Age: 87
End: 2022-01-28
Payer: MEDICARE

## 2022-01-28 PROCEDURE — 0651 HSPC ROUTINE HOME CARE

## 2022-01-28 PROCEDURE — G0156 HHCP-SVS OF AIDE,EA 15 MIN: HCPCS

## 2022-01-29 PROCEDURE — 0651 HSPC ROUTINE HOME CARE

## 2022-01-30 PROCEDURE — 0651 HSPC ROUTINE HOME CARE

## 2022-01-31 ENCOUNTER — HOME CARE VISIT (OUTPATIENT)
Dept: SCHEDULING | Facility: HOME HEALTH | Age: 87
End: 2022-01-31
Payer: MEDICARE

## 2022-01-31 VITALS
TEMPERATURE: 98.2 F | RESPIRATION RATE: 16 BRPM | DIASTOLIC BLOOD PRESSURE: 82 MMHG | SYSTOLIC BLOOD PRESSURE: 130 MMHG | HEART RATE: 63 BPM

## 2022-01-31 PROCEDURE — G0299 HHS/HOSPICE OF RN EA 15 MIN: HCPCS

## 2022-01-31 PROCEDURE — 0651 HSPC ROUTINE HOME CARE

## 2022-01-31 PROCEDURE — G0156 HHCP-SVS OF AIDE,EA 15 MIN: HCPCS

## 2022-01-31 NOTE — HOSPICE
Arrived to patients room and she was sitting up in bed with o2 via nasal canula at 3 liters. Patient is A/O to name. Patient rouses easily with verbal stimuli and light touch. Appetite is unchanged and tolerating puree with honey consistent liquids. Patients complaints were none. Respirations were 16 and unlabored. Lung sounds clear. Last Bowel Movement [id number] with active bowel sounds. . Heart rate 63 and regular. No edema and pulses palpable. No nonverbal signs of pain, shortness of breath or agitation. Skin intact and well moisturized. Medication reconciled with caregiver. Supplies ordered.  Told to contact hospice with questions, concerns or change in status

## 2022-02-01 PROCEDURE — 0651 HSPC ROUTINE HOME CARE

## 2022-02-02 PROCEDURE — 0651 HSPC ROUTINE HOME CARE

## 2022-02-03 PROCEDURE — 0651 HSPC ROUTINE HOME CARE

## 2022-02-04 ENCOUNTER — HOME CARE VISIT (OUTPATIENT)
Dept: SCHEDULING | Facility: HOME HEALTH | Age: 87
End: 2022-02-04
Payer: MEDICARE

## 2022-02-04 PROCEDURE — G0156 HHCP-SVS OF AIDE,EA 15 MIN: HCPCS

## 2022-02-04 PROCEDURE — 0651 HSPC ROUTINE HOME CARE

## 2022-02-05 PROCEDURE — 0651 HSPC ROUTINE HOME CARE

## 2022-02-06 PROCEDURE — 0651 HSPC ROUTINE HOME CARE

## 2022-02-07 ENCOUNTER — HOME CARE VISIT (OUTPATIENT)
Dept: SCHEDULING | Facility: HOME HEALTH | Age: 87
End: 2022-02-07
Payer: MEDICARE

## 2022-02-07 PROCEDURE — G0156 HHCP-SVS OF AIDE,EA 15 MIN: HCPCS

## 2022-02-07 PROCEDURE — 0651 HSPC ROUTINE HOME CARE

## 2022-02-07 PROCEDURE — G0299 HHS/HOSPICE OF RN EA 15 MIN: HCPCS

## 2022-02-08 VITALS
SYSTOLIC BLOOD PRESSURE: 138 MMHG | TEMPERATURE: 97.5 F | HEART RATE: 64 BPM | DIASTOLIC BLOOD PRESSURE: 80 MMHG | RESPIRATION RATE: 18 BRPM

## 2022-02-08 PROCEDURE — 0651 HSPC ROUTINE HOME CARE

## 2022-02-08 NOTE — HOSPICE
Arrived to patients room and she was in dayroom with other patients listening to music. Patient is A/O to name. Caregiver at [facility] stating patient has  progressively been sleeping more during the day and appetite has decreased over the past week. She continues to be pleasantly confused and responds easily with verbal stimuli and light touch. states he eats       meals daily. Fingernail care completed and she is max assist with activities of daily living. Respirations were 18 and unlabored. Lung sounds were clear. Last Bowel Movement [id number]. Heart rate 64 and regular, pulses palpable. No nonverbal signs of pain, shortness of breath or agitation. Skin intact and well moisturized. Medication reconciled with caregiver. Supplies ordered. Oxygen at 3 liters via nasal canula when not eating.  Told to contact hospice with questions, concerns or change in status

## 2022-02-09 PROCEDURE — 0651 HSPC ROUTINE HOME CARE

## 2022-02-10 ENCOUNTER — HOME CARE VISIT (OUTPATIENT)
Dept: SCHEDULING | Facility: HOME HEALTH | Age: 87
End: 2022-02-10
Payer: MEDICARE

## 2022-02-10 PROCEDURE — 0651 HSPC ROUTINE HOME CARE

## 2022-02-10 PROCEDURE — G0156 HHCP-SVS OF AIDE,EA 15 MIN: HCPCS

## 2022-02-11 PROCEDURE — 0651 HSPC ROUTINE HOME CARE

## 2022-02-12 PROCEDURE — 0651 HSPC ROUTINE HOME CARE

## 2022-02-13 PROCEDURE — 0651 HSPC ROUTINE HOME CARE

## 2022-02-14 ENCOUNTER — HOME CARE VISIT (OUTPATIENT)
Dept: SCHEDULING | Facility: HOME HEALTH | Age: 87
End: 2022-02-14
Payer: MEDICARE

## 2022-02-14 PROCEDURE — G0299 HHS/HOSPICE OF RN EA 15 MIN: HCPCS

## 2022-02-14 PROCEDURE — 0651 HSPC ROUTINE HOME CARE

## 2022-02-14 PROCEDURE — G0156 HHCP-SVS OF AIDE,EA 15 MIN: HCPCS

## 2022-02-15 ENCOUNTER — HOME CARE VISIT (OUTPATIENT)
Dept: HOSPICE | Facility: HOSPICE | Age: 87
End: 2022-02-15
Payer: MEDICARE

## 2022-02-15 VITALS
DIASTOLIC BLOOD PRESSURE: 74 MMHG | HEART RATE: 69 BPM | SYSTOLIC BLOOD PRESSURE: 132 MMHG | TEMPERATURE: 97.9 F | RESPIRATION RATE: 16 BRPM

## 2022-02-15 PROCEDURE — 0651 HSPC ROUTINE HOME CARE

## 2022-02-15 NOTE — HOSPICE
Music Therapy Visit    Date: 2/15/2022    Mental Status:   [  ] Alert [  ] Forgetful [x]  Confused  [  ] Minimally responsive  [x] Sleeping    Communication Status: [  ] Impaired Speech [x] Nonverbal     Physical Status:   [  ] Oxygen in use  [  ] Hard of Hearing [  ] Vision Impaired   [  ] Ambulatory  [  ] Ambulatory with assistance [  ] Non-ambulatory -N/A    Music Preferences, Background: Traditional hymns     Clinical Problem addressed: Positive social interaction and spiritual support    Goal(s) met in session:  Physical/Pain management (Scale of 1-10):    Pre-session rating: Pt didn't report on this  Post-session rating: Pt didn't report on this  [  ] Increased relaxation   [  ] Affected breathing patterns  [  ] Decreased muscle tension   [  ] Decreased agitation  [  ] Affected heart rate   [x] Increased alertness     Emotional/Psychological:  [  ] Increased self-expression  [  ] Decreased aggressive behavior   [  ] Decreased feelings of stress  [  ] Discussed healthy coping skills     [  ] Improved mood   [  ] Decreased withdrawn behavior     Social:  [  ] Decreased feelings of isolation/loneliness [x] Positive social interaction    [  ] Provided support and/or comfort for family/friends    Spiritual:  [x] Spiritual support    [  ] Expressed peace  [  ] Expressed harsh    [  ] Discussed beliefs    Techniques Utilized (Check all that apply):   [  ] Procedural support MT [  ] Music for relaxation [x] Patient preferred music  [  ] Benita analysis  [  ] Song choice  [x] Music for validation  [  ] Entrainment  [  ] Movement to music [  ] Guided visualization  [  ] Jacqlyn Ban  [  ] Patient instrument playing [  ] Colten Anderson writing  [  ] Dot Cunningham along   [  ] Improvisation  [x] Sensory stimulation  [  ] Active Listening  [x] Music for spiritual support [  ] Making of CDs as gifts    Session Observations:  Ms. Aniya Thomas is a 79yo with a dx of Alzheimer's Disease.  Pt was referred to music therapy for psychosocial support, and was last visited by this writer on 2/15. Upon arrival, pt was in her chair, asleep. After confirming with staff that it was okay to wake her, this music therapist eligible (MTE) quietly spoke the pt's name and rubbed her arm to wake her. Her eyes opened and she responded with a smile to this. MTE greeted her and shared role. She giggled in response to this. MTE sat at chairside, and sang and played the hymn What a Friend We Have in [employer]. Pt briefly made eye contact with this MTE, and appeared to fall asleep half way through the song. MTE attempted to wake the pt by tapping the tempo of the song on her arms and legs, but pt didn't awake to this. MTE sang and played through other hymns, attempting to further engage with the pt, but was unsuccessful in waking her. MTE concluded the session at this time, and exited quietly.      Kelsey Miramontes, Music Therapist Eligible  Spiritual Care Department   Referral-Based Services

## 2022-02-15 NOTE — HOSPICE
Arrived to patient room and she was up in wheelchair. Alert to name. Incont of bowel and bladder. Max assist with mobility and ADL's. . Pulse 69 and regular. No edema. LE pulses palpable. Respirations 16 and nonlabored. Bowel Sounds active and last BM on the 12th noted per staff. No pain voiced. No nonverbal signs of pain, agitation or shortness of breath. Small 1 cm x 1 cm blister noted to left big toe. Skin is intact. Patient to wear positioning boots when in bed to relieve pressure/offload. Nursing staff are aware. Supplies ordered. Med rec complete. Told to contact hospice with any changes, concerns or questions.

## 2022-02-16 PROCEDURE — 0651 HSPC ROUTINE HOME CARE

## 2022-02-17 ENCOUNTER — HOME CARE VISIT (OUTPATIENT)
Dept: SCHEDULING | Facility: HOME HEALTH | Age: 87
End: 2022-02-17
Payer: MEDICARE

## 2022-02-17 PROCEDURE — G0156 HHCP-SVS OF AIDE,EA 15 MIN: HCPCS

## 2022-02-17 PROCEDURE — 0651 HSPC ROUTINE HOME CARE

## 2022-02-18 PROCEDURE — 0651 HSPC ROUTINE HOME CARE

## 2022-02-19 PROCEDURE — 0651 HSPC ROUTINE HOME CARE

## 2022-02-20 PROCEDURE — 0651 HSPC ROUTINE HOME CARE

## 2022-02-21 ENCOUNTER — HOME CARE VISIT (OUTPATIENT)
Dept: HOSPICE | Facility: HOSPICE | Age: 87
End: 2022-02-21
Payer: MEDICARE

## 2022-02-21 ENCOUNTER — HOME CARE VISIT (OUTPATIENT)
Dept: SCHEDULING | Facility: HOME HEALTH | Age: 87
End: 2022-02-21
Payer: MEDICARE

## 2022-02-21 VITALS
DIASTOLIC BLOOD PRESSURE: 70 MMHG | TEMPERATURE: 97.6 F | RESPIRATION RATE: 16 BRPM | SYSTOLIC BLOOD PRESSURE: 126 MMHG | OXYGEN SATURATION: 97 % | HEART RATE: 69 BPM

## 2022-02-21 PROCEDURE — G0156 HHCP-SVS OF AIDE,EA 15 MIN: HCPCS

## 2022-02-21 PROCEDURE — 0651 HSPC ROUTINE HOME CARE

## 2022-02-21 PROCEDURE — G0299 HHS/HOSPICE OF RN EA 15 MIN: HCPCS

## 2022-02-22 ENCOUNTER — HOME CARE VISIT (OUTPATIENT)
Dept: HOSPICE | Facility: HOSPICE | Age: 87
End: 2022-02-22
Payer: MEDICARE

## 2022-02-22 PROCEDURE — 0651 HSPC ROUTINE HOME CARE

## 2022-02-22 NOTE — HOSPICE
Arrived to patients room and she was sitting in wheelchair with nasal canula intact at 3 liters. Patient rouses easily with verbal stimuli and light touch. Seemed to be oriented to name. Appetite is  better today as stated by staff. Patients complaints were none Respirations were unlabored. Lung were clear with diminished sounds in the bases. Bowel sound active in all quadrants. Heart rate regular. No edema to bilateral feet, pulses palpable. No nonverbal signs of pain, shortness of breath or agitation. Skin intact and well moisturized. Toe blister is now a small red spot and not open. Medication reconciled with caregiver. Nos supplies needed. Continues to sleep more during the day.  Told to contact hospice with questions, concerns or change in status

## 2022-02-23 PROCEDURE — 0651 HSPC ROUTINE HOME CARE

## 2022-02-24 ENCOUNTER — HOME CARE VISIT (OUTPATIENT)
Dept: SCHEDULING | Facility: HOME HEALTH | Age: 87
End: 2022-02-24
Payer: MEDICARE

## 2022-02-24 PROCEDURE — 0651 HSPC ROUTINE HOME CARE

## 2022-02-24 PROCEDURE — G0155 HHCP-SVS OF CSW,EA 15 MIN: HCPCS

## 2022-02-25 ENCOUNTER — HOME CARE VISIT (OUTPATIENT)
Dept: SCHEDULING | Facility: HOME HEALTH | Age: 87
End: 2022-02-25
Payer: MEDICARE

## 2022-02-25 PROCEDURE — 0651 HSPC ROUTINE HOME CARE

## 2022-02-25 PROCEDURE — G0156 HHCP-SVS OF AIDE,EA 15 MIN: HCPCS

## 2022-02-25 NOTE — HOSPICE
LMSW made visit bedside with patient at [facility]. LMSW provided music-based socialization to patient singing familiar hymns which patient engaged with though eye contact and smiling. LMSW checked in with facility staff who reported no concerns. LMSW called to update patient's son on visit and offer support. Son reported no concerns and expressed appreciation for visit. LMSW will continue to provide support and be available to address needs that arise.

## 2022-02-26 PROCEDURE — 0651 HSPC ROUTINE HOME CARE

## 2022-02-27 PROCEDURE — 0651 HSPC ROUTINE HOME CARE

## 2022-02-28 ENCOUNTER — HOME CARE VISIT (OUTPATIENT)
Dept: SCHEDULING | Facility: HOME HEALTH | Age: 87
End: 2022-02-28
Payer: MEDICARE

## 2022-02-28 ENCOUNTER — HOME CARE VISIT (OUTPATIENT)
Dept: HOSPICE | Facility: HOSPICE | Age: 87
End: 2022-02-28
Payer: MEDICARE

## 2022-02-28 VITALS
HEART RATE: 90 BPM | TEMPERATURE: 98.4 F | RESPIRATION RATE: 18 BRPM | SYSTOLIC BLOOD PRESSURE: 110 MMHG | DIASTOLIC BLOOD PRESSURE: 70 MMHG

## 2022-02-28 PROCEDURE — G0299 HHS/HOSPICE OF RN EA 15 MIN: HCPCS

## 2022-02-28 PROCEDURE — 0651 HSPC ROUTINE HOME CARE

## 2022-02-28 PROCEDURE — G0156 HHCP-SVS OF AIDE,EA 15 MIN: HCPCS

## 2022-02-28 NOTE — HOSPICE
Arrived to patient home and met by caregiver        . Patient is A/O to name. Patient rouses easily with verbal stimuli and light touch. Appetite is good today, eating 100 % of puree food. Patients complaints were none. Respirations were unlabored. Lung were clear with diminished sounds in the bases. Last Bowel Movement [id number]. Heart rate regular. No edema, pulses palpable. No nonverbal signs of pain, shortness of breath or agitation. Skin intact and well moisturized. Blister to toe has healed. Medication reconciled with caregiver. Supplies ordered. Oxygen on at 2 l via nasal canula. Education related to honey consistency puree diet, positioning and medications completed.  Told to contact hospice with questions, concerns or change in status,

## 2022-03-01 PROCEDURE — 0651 HSPC ROUTINE HOME CARE

## 2022-03-02 PROCEDURE — 0651 HSPC ROUTINE HOME CARE

## 2022-03-03 PROCEDURE — 0651 HSPC ROUTINE HOME CARE

## 2022-03-04 PROCEDURE — 0651 HSPC ROUTINE HOME CARE

## 2022-03-05 PROCEDURE — 0651 HSPC ROUTINE HOME CARE

## 2022-03-06 PROCEDURE — 0651 HSPC ROUTINE HOME CARE

## 2022-03-07 ENCOUNTER — HOME CARE VISIT (OUTPATIENT)
Dept: SCHEDULING | Facility: HOME HEALTH | Age: 87
End: 2022-03-07
Payer: MEDICARE

## 2022-03-07 VITALS
TEMPERATURE: 98.2 F | SYSTOLIC BLOOD PRESSURE: 104 MMHG | RESPIRATION RATE: 16 BRPM | HEART RATE: 59 BPM | DIASTOLIC BLOOD PRESSURE: 60 MMHG

## 2022-03-07 PROCEDURE — G0299 HHS/HOSPICE OF RN EA 15 MIN: HCPCS

## 2022-03-07 PROCEDURE — G0156 HHCP-SVS OF AIDE,EA 15 MIN: HCPCS

## 2022-03-07 PROCEDURE — 0651 HSPC ROUTINE HOME CARE

## 2022-03-07 NOTE — HOSPICE
Arrived to patients room and she was up in wheelchair. Drowsy during assessment but orientd to name as she she laouhgs and opens eyes when name is called. Patient rouses easily with verbal stimuli and light touch. Appetite waxes and wanes. Patients complaints were none. Respirations were unlabored. Lung were clear with diminished sounds in the bases. Last Bowel Movement [id number]. Heart rate regular, no edema noted, pulses palpable. No nonverbal signs of pain, shortness of breath or agitation. Skin intact and well moisturized. Medication reconciled with caregiver. Supplies ordered. Oxygen via nasal canula at 3 liters intatct. Max assist with activities of daily living and max assist with transfers and mobility. Told to contact hospice with questions, concerns or change in status. Son was called and updated.

## 2022-03-08 ENCOUNTER — HOME CARE VISIT (OUTPATIENT)
Dept: HOSPICE | Facility: HOSPICE | Age: 87
End: 2022-03-08
Payer: MEDICARE

## 2022-03-08 PROCEDURE — 0651 HSPC ROUTINE HOME CARE

## 2022-03-09 PROCEDURE — 0651 HSPC ROUTINE HOME CARE

## 2022-03-10 ENCOUNTER — HOME CARE VISIT (OUTPATIENT)
Dept: HOSPICE | Facility: HOSPICE | Age: 87
End: 2022-03-10
Payer: MEDICARE

## 2022-03-10 ENCOUNTER — HOME CARE VISIT (OUTPATIENT)
Dept: SCHEDULING | Facility: HOME HEALTH | Age: 87
End: 2022-03-10
Payer: MEDICARE

## 2022-03-10 PROCEDURE — G0155 HHCP-SVS OF CSW,EA 15 MIN: HCPCS

## 2022-03-10 PROCEDURE — 0651 HSPC ROUTINE HOME CARE

## 2022-03-10 NOTE — HOSPICE
LMSW attended care plan meeting with facility , , and dietician along with hospice facility liaison and JEFF Ludwig. Patient's current condition and care plan were discussed. LMSW made visit to patient for socialization. LMSW provided music-based socialization. Patient engaged through eye contact and facial expressions. Patient able to verbalize a few words at a time. LMSW will continue to be available to provide support and address needs that arise.

## 2022-03-10 NOTE — HOSPICE
Routine follow up visit w/ RN Cayla Hernandez to provide comfort and spiritual guidance to the patient, family and caregiver as they make their journey towards end of life. Upon arrival, patient was lying in bed, but had shifted to one side and seemed uncomfortable. RN assisted in repositioning patient to a better position. Patient initially seemed tired, but brightened up with the company and support of staff and a visit from pet therapy dog. Engaged in music: sang familiar hymns nurturing Ms Martinez Moss' [address]. Offered Scripture reading noting that patient is finishing her end of life journey well in spite of physical and cognitive challenges w/ decline. Affirmed God's faithfulness and presence. Prayed the [employer] together.     Danielle et al. Assessing Spiritual Concerns in Palliative Care (Updated 22)   Need for Meaning in the Face of Sufferin (patient at times tearful and struggles to engage others)   Need for integrity, legacy, generativity: 0* (patient postively responds to music, but unable to engage in life review due to cognitive impairment)   Concerns about relationships: family and/or significant others: 0*   Concern or fear of dying or death: 0*   Issues related to making decisions about treatment: 0   R/S Struggle: 2 (patient is [place] and her  was a ; feels alienated from formerly meaningful connections with [employer] institutions or leaders; music therapy and  following; enjoys [employer] and prayer)   0--no evidence of unmet need; (0*--further assessment needed to clarify needs) 1--some evidence of unmet need 2--substantial evidence of unmet need 3--evidence of severe unmet need   Total Spiritual Distress Score: 4/18 (low)

## 2022-03-11 PROCEDURE — 0651 HSPC ROUTINE HOME CARE

## 2022-03-12 PROCEDURE — 0651 HSPC ROUTINE HOME CARE

## 2022-03-13 PROCEDURE — 0651 HSPC ROUTINE HOME CARE

## 2022-03-14 ENCOUNTER — HOME CARE VISIT (OUTPATIENT)
Dept: SCHEDULING | Facility: HOME HEALTH | Age: 87
End: 2022-03-14
Payer: MEDICARE

## 2022-03-14 ENCOUNTER — HOME CARE VISIT (OUTPATIENT)
Dept: HOSPICE | Facility: HOSPICE | Age: 87
End: 2022-03-14
Payer: MEDICARE

## 2022-03-14 PROCEDURE — G0156 HHCP-SVS OF AIDE,EA 15 MIN: HCPCS

## 2022-03-14 PROCEDURE — 0651 HSPC ROUTINE HOME CARE

## 2022-03-14 PROCEDURE — G0299 HHS/HOSPICE OF RN EA 15 MIN: HCPCS

## 2022-03-14 NOTE — HOSPICE
Music Therapy Visit    Date: 3/14/22    Mental Status:   [X] Alert  [  ] Forgetful [X]  Confused  [  ] Minimally responsive  [  ] Sleeping    Communication Status: [  ] Impaired Speech [  ] Nonverbal -N/A    Physical Status:   [  ] Oxygen in use  [  ] Hard of Hearing [  ] Vision Impaired   [  ] Ambulatory  [  ] Ambulatory with assistance [  ] Non-ambulatory -N/A    Music Preferences, Background: Traditional hymns     Clinical Problem addressed: Psychosocial and spiritual support    Goal(s) met in session:  Physical/Pain management (Scale of 1-10):    Pre-session rating: Pt couldn't report on this  Post-session rating: Pt didn't report on this   [  ] Increased relaxation   [  ] Affected breathing patterns  [  ] Decreased muscle tension   [  ] Decreased agitation  [  ] Affected heart rate    [x] Increased alertness     Emotional/Psychological:  [x] Increased self-expression   [  ] Decreased aggressive behavior   [  ] Decreased feelings of stress  [  ] Discussed healthy coping skills     [  ] Improved mood    [  ] Decreased withdrawn behavior     Social:  [  ] Decreased feelings of isolation/loneliness [x] Positive social interaction    [  ] Provided support and/or comfort for family/friends    Spiritual:  [x] Spiritual support    [  ] Expressed peace  [  ] Expressed harsh    [  ] Discussed beliefs    Techniques Utilized (Check all that apply):   [  ] Procedural support MT [x] Music for relaxation [x] Patient preferred music  [  ] Benita analysis  [  ] Song choice  [x] Music for validation  [  ] Entrainment              [  ] Movement to music [  ] Guided visualization  [  ] Dereka Ling  [  ] Patient instrument playing [  ] Corrie Farmer writing  [  ] Minna Tran along   [  ] Ermelinda Vicente  [  ] Sensory stimulation  [  ] Active Listening  [x] Music for spiritual support [  ] Making of CDs as gifts    Session Observations: Ms. Cherie De La Fuente is a 79yo with a dx of Alzheimer's Disease.  Pt was referred to music therapy for psychosocial support and was last visited by Harpreet Ceballos and this writer on 3/14. Upon arrival, pt was sitting up in bed, appearing to be in good spirits as she ate her lunch. Pt also has a RN and staff member at bedside assisting with lunch and providing care. This music therapist (MT) and  stood at bedside and greeted the pt. Pt made eye contact and smiled in response to this. As pt's RN exited, MT and  sang and played various hymns with guitar and [email or web address]. [address] [place] [address] also engaged in conversation over various topics. Pt verbally responded at times, though it was difficult to understand her due to her quiet speech.  provided scripture and prayer while MT fingerpicked a pattern on the guitar for further support. MT concluded the session by singing and playing How Great Thou Art and God Be With You Till We Meet Again with guitar at a slow tempo. MT and  thanked the pt for her time and exited.      Rory Jamil MT-BC (Music Therapist, Board Certified)  [id number] [facility]

## 2022-03-14 NOTE — HOSPICE
Routine follow up visit w/ music therapy to provide comfort and spiritual guidance to the patient, family and caregiver as they make their journey towards end of life. Upon arrival, patient was sitting up and finishing her lunch w/ staff assistance. Ms Marni Hannah appeared more alert - she verbalized some and smiled more throughout the visit. Engaged in music singing familiar hymns to enhance patient's legacy as a [place] and 's wife. Read Scripture and provided prayer emphasizing God as a refuge. Overall, patient coping well at this time. Will continue to follow.     Danielle et al. Assessing Spiritual Concerns in Palliative Care (Updated 22)   Need for Meaning in the Face of Sufferin (patient at times tearful and struggles to engage others)   Need for integrity, legacy, generativity: 0* (patient postively responds to music, but unable to engage in life review due to cognitive impairment)   Concerns about relationships: family and/or significant others: 0*   Concern or fear of dying or death: 0*   Issues related to making decisions about treatment: 0   R/S Struggle: 2 (patient is [facility] and her  was a ; feels alienated from formerly meaningful connections with [employer] institutions or leaders; music therapy and  following; enjoys [employer] and prayer)   0--no evidence of unmet need; (0*--further assessment needed to clarify needs) 1--some evidence of unmet need 2--substantial evidence of unmet need 3--evidence of severe unmet need   Total Spiritual Distress Score: 4/18 (low)

## 2022-03-15 VITALS
HEART RATE: 60 BPM | RESPIRATION RATE: 18 BRPM | SYSTOLIC BLOOD PRESSURE: 120 MMHG | DIASTOLIC BLOOD PRESSURE: 80 MMHG | TEMPERATURE: 98.4 F

## 2022-03-15 PROCEDURE — 0651 HSPC ROUTINE HOME CARE

## 2022-03-15 NOTE — HOSPICE
Arrived to patients room and she was up in bed with nursing students eating lunch. Music therapy and  in to visit. Orientd to name as she she laouhgs and opens eyes when name is called. Appetite waxes and wanes. ATe 50% today. Patients complaints were none. Respirations were unlabored. Lung were clear. Last Bowel Movement [id number]. Heart rate regular, no edema noted, pulses palpable. No nonverbal signs of pain, shortness of breath or agitation. Skin intact and well moisturized. Medication reconciled with caregiver. Supplies ordered. Oxygen via nasal canula at 3 liters intatct. Max assist with activities of daily living and max assist with transfers and mobility. Told to contact hospice with questions, concerns or change in status. Son was called and updated.

## 2022-03-16 PROCEDURE — 0651 HSPC ROUTINE HOME CARE

## 2022-03-17 PROCEDURE — 0651 HSPC ROUTINE HOME CARE

## 2022-03-18 PROCEDURE — 0651 HSPC ROUTINE HOME CARE

## 2022-03-19 PROCEDURE — 0651 HSPC ROUTINE HOME CARE

## 2022-03-20 PROCEDURE — 0651 HSPC ROUTINE HOME CARE

## 2022-03-21 ENCOUNTER — HOME CARE VISIT (OUTPATIENT)
Dept: SCHEDULING | Facility: HOME HEALTH | Age: 87
End: 2022-03-21
Payer: MEDICARE

## 2022-03-21 VITALS
HEART RATE: 71 BPM | DIASTOLIC BLOOD PRESSURE: 71 MMHG | TEMPERATURE: 98 F | RESPIRATION RATE: 16 BRPM | SYSTOLIC BLOOD PRESSURE: 143 MMHG

## 2022-03-21 PROCEDURE — G0299 HHS/HOSPICE OF RN EA 15 MIN: HCPCS

## 2022-03-21 PROCEDURE — 0651 HSPC ROUTINE HOME CARE

## 2022-03-21 PROCEDURE — HOSPICE MEDICATION HC HH HOSPICE MEDICATION

## 2022-03-21 PROCEDURE — G0156 HHCP-SVS OF AIDE,EA 15 MIN: HCPCS

## 2022-03-21 NOTE — HOSPICE
Arrived to patients room and she was up in wheelchair. Drowsy during assessment but oriented to name as she she laughs and opens eyes when name is called. Patient rouses easily with verbal stimuli and light touch. Appetite waxes and wanes. Holding food in mouth some today. Patients complaints were none. Respirations were unlabored. Lung were clear with diminished sounds in the bases. Last Bowel Movement [id number]. Heart rate regular, no edema noted, pulses palpable. No nonverbal signs of pain, shortness of breath or agitation. Skin intact and well moisturized. rownish foul smelling vaginal discharge noted. Called Dr Tracy Ball and patient was ordered Flagyl liquid 500 mg bid for 7 days. Called to [facility] and written order given to staff. New o2 compressor ordered from [employer]. Old compressor not working well. Using back up tank until new compressor arrives. Medication reconciled with caregiver. Supplies ordered. Oxygen via nasal canula at 3 liters intact. Max assist with adls. Told to contact hospice with any questions concerns or changes in status.

## 2022-03-22 PROCEDURE — 0651 HSPC ROUTINE HOME CARE

## 2022-03-23 PROCEDURE — 0651 HSPC ROUTINE HOME CARE

## 2022-03-24 PROCEDURE — 0651 HSPC ROUTINE HOME CARE

## 2022-03-25 PROCEDURE — 0651 HSPC ROUTINE HOME CARE

## 2022-03-26 PROCEDURE — 0651 HSPC ROUTINE HOME CARE

## 2022-03-27 PROCEDURE — 0651 HSPC ROUTINE HOME CARE

## 2022-03-28 ENCOUNTER — HOME CARE VISIT (OUTPATIENT)
Dept: SCHEDULING | Facility: HOME HEALTH | Age: 87
End: 2022-03-28
Payer: MEDICARE

## 2022-03-28 PROCEDURE — 0651 HSPC ROUTINE HOME CARE

## 2022-03-28 PROCEDURE — G0156 HHCP-SVS OF AIDE,EA 15 MIN: HCPCS

## 2022-03-29 ENCOUNTER — HOME CARE VISIT (OUTPATIENT)
Dept: SCHEDULING | Facility: HOME HEALTH | Age: 87
End: 2022-03-29
Payer: MEDICARE

## 2022-03-29 VITALS
HEART RATE: 67 BPM | TEMPERATURE: 97.7 F | SYSTOLIC BLOOD PRESSURE: 127 MMHG | RESPIRATION RATE: 20 BRPM | DIASTOLIC BLOOD PRESSURE: 86 MMHG

## 2022-03-29 PROCEDURE — G0299 HHS/HOSPICE OF RN EA 15 MIN: HCPCS

## 2022-03-29 PROCEDURE — 0651 HSPC ROUTINE HOME CARE

## 2022-03-29 NOTE — HOSPICE
Arrived to patients room and she was up in bed getting bath. Orientd to name as she she lauhgs and opens eyes when name is called. Patient revongnized a visitor from [employer]. Appetite waxes and wanes. Patients complaints were none. Respirations were unlabored. Lung were clear with diminished sounds in the bases. Last Bowel Movement [id number]. Heart rate regular, no edema noted, pulses palpable. No nonverbal signs of pain, shortness of breath or agitation. Skin intact and well moisturized. No drainage noted in prineum area or odor. Medication reconciled with caregiver. Supplies ordered. Oxygen via nasal canula at 3 liters intatct. Max assist with activities of daily living and max assist with transfers and mobility. Told to contact hospice with questions, concerns or change in status.  Son was called and updated

## 2022-03-30 PROCEDURE — 0651 HSPC ROUTINE HOME CARE

## 2022-03-31 PROCEDURE — 0651 HSPC ROUTINE HOME CARE

## 2022-04-01 PROCEDURE — 0651 HSPC ROUTINE HOME CARE

## 2022-04-02 PROCEDURE — 0651 HSPC ROUTINE HOME CARE

## 2022-04-03 PROCEDURE — 0651 HSPC ROUTINE HOME CARE

## 2022-04-04 ENCOUNTER — HOME CARE VISIT (OUTPATIENT)
Dept: SCHEDULING | Facility: HOME HEALTH | Age: 87
End: 2022-04-04
Payer: MEDICARE

## 2022-04-04 ENCOUNTER — HOME CARE VISIT (OUTPATIENT)
Dept: HOSPICE | Facility: HOSPICE | Age: 87
End: 2022-04-04
Payer: MEDICARE

## 2022-04-04 VITALS
HEART RATE: 69 BPM | RESPIRATION RATE: 16 BRPM | SYSTOLIC BLOOD PRESSURE: 150 MMHG | DIASTOLIC BLOOD PRESSURE: 90 MMHG | TEMPERATURE: 98 F

## 2022-04-04 PROCEDURE — G0156 HHCP-SVS OF AIDE,EA 15 MIN: HCPCS

## 2022-04-04 PROCEDURE — G0299 HHS/HOSPICE OF RN EA 15 MIN: HCPCS

## 2022-04-04 PROCEDURE — 0651 HSPC ROUTINE HOME CARE

## 2022-04-04 NOTE — HOSPICE
Arrived to patients room and she was up in wheelchair. Drowsy during assessment but orientd to name. Patient rouses easily with verbal stimuli and light touch. Appetite waxes and wanes. Patients complaints were none. Respirations were unlabored. Lung were clear with diminished sounds in the bases. Last Bowel Movement [id number]. o2 at 3 liters via nasal canaula when not eating. Heart rate regular, no edema noted, pulses palpable. No nonverbal signs of pain, shortness of breath or agitation. Skin intact and well moisturized. Medication reconciled with caregiver. Supplies ordered. Max assist with activities of daily living and max assist with transfers and mobility. Told to contact hospice with questions, concerns or change in status. Son was called and updated.

## 2022-04-04 NOTE — HOSPICE
Music Therapy Visit     Date: 4/4/22    Mental Status:   [x] Alert [  ] Юлия Sickle [  ]  Confused  [  ] Minimally responsive  [  ] Sleeping    Clinical Problem addressed: Socialization and spiritual support    Session Observations: This music therapist (MT) attempted a visit with Ms. Alfaro. Upon arrival, this MT saw the patient (pt) was receiving care from a staff member, and was about to have a visit with her RN. Due to limited time, MT assessed a follow up would be best for another day. MT will attempt follow up on Thursday, 4/7.

## 2022-04-05 PROCEDURE — 0651 HSPC ROUTINE HOME CARE

## 2022-04-06 ENCOUNTER — HOME CARE VISIT (OUTPATIENT)
Dept: SCHEDULING | Facility: HOME HEALTH | Age: 87
End: 2022-04-06
Payer: MEDICARE

## 2022-04-06 ENCOUNTER — HOME CARE VISIT (OUTPATIENT)
Dept: HOSPICE | Facility: HOSPICE | Age: 87
End: 2022-04-06
Payer: MEDICARE

## 2022-04-06 PROCEDURE — G0155 HHCP-SVS OF CSW,EA 15 MIN: HCPCS

## 2022-04-06 PROCEDURE — 0651 HSPC ROUTINE HOME CARE

## 2022-04-06 NOTE — HOSPICE
LMSW attempted visit but patient was in the [employer] for multiple hours. Hospice MD notified of missed visit. LMSW will continue to be available to provide support and address needs that arise. Visit documented under PRN via technical error.

## 2022-04-07 ENCOUNTER — HOME CARE VISIT (OUTPATIENT)
Dept: HOSPICE | Facility: HOSPICE | Age: 87
End: 2022-04-07
Payer: MEDICARE

## 2022-04-07 PROCEDURE — 0651 HSPC ROUTINE HOME CARE

## 2022-04-07 NOTE — HOSPICE
Music Therapy Visit    Date: 4/7/22    Mental Status:   [x] Alert  [  ] Maryl Hard [  ]  Confused  [x] Minimally responsive  [  ] Sleeping    Communication Status: [  ] Impaired Speech [  ] Nonverbal -N/A    Physical Status:   [  ] Oxygen in use  [  ] Hard of Hearing [  ] Vision Impaired   [  ] Ambulatory  [  ] Ambulatory with assistance [  ] Non-ambulatory -N/A    Music Preferences, Background:Harjinderiireal carias    Clinical Problem addressed: Spiritual support and comfort    Goal(s) met in session:  Physical/Pain management (Scale of 1-10):    Pre-session rating: Pt couldn't report on this  Post-session rating: Pt couldn't report on this  [  ] Increased relaxation   [  ] Affected breathing patterns  [  ] Decreased muscle tension   [  ] Decreased agitation  [  ] Affected heart rate    [x] Increased alertness     Emotional/Psychological:  [  ] Increased self-expression   [  ] Decreased aggressive behavior   [  ] Decreased feelings of stress  [  ] Discussed healthy coping skills     [  ] Improved mood    [  ] Decreased withdrawn behavior     Social:  [  ] Decreased feelings of isolation/loneliness [  ] Positive social interaction    [  ] Provided support and/or comfort for family/friends    Spiritual:  [x] Spiritual support    [  ] Expressed peace  [  ] Expressed harsh    [  ] Discussed beliefs    Techniques Utilized (Check all that apply):   [  ] Procedural support MT [  ] Music for relaxation [x] Patient preferred music  [  ] Benita analysis  [  ] Song choice  [  ] Music for validation  [  ] Entrainment  [  ] Movement to music [  ] Guided visualization  [  ] Darlean Simmonds  [  ] Patient instrument playing [  ] Monda Degree writing  [  ] Govind powell   [  ] Improvisation  [x] Sensory stimulation  [  ] Active Listening  [x] Music for spiritual support [  ] Making of CDs as gifts    Session Observations:  Ms. Nita Zapata is a 81yo with a dx of Alzheimer's Disease.  Pt was referred to music therapy for psychosocial support and was last visited by this writer on 4/7. Pt was alert, lying in bed while focusing her attention on the ceiling. When this music therapist (MT) entered the room, MT lightly rubbed the patient's (pt) shoulder and greeted her. Pt looked at this MT and smiled in response to this. MT began to sing In the Garden a capella, while lightly tapping the pt's shoulders for further sensory support. MT then sat at bedside and sang and played a variety of hymns with guitar at a slow tempo. Throughout the session, MT engaged with the pt by describing her surroundings and discussing songs that were sung. Pt giggled at times in response to this, but appeared to maintain focus on the ceiling. MT then sang The Lord's Prayer and God Be With You Till We Meet Again a capella while continuing to lightly rub and tap the pt's shoulders for further comfort. Pt made eye contact with this MT during this time.  MT concluded the session at this time, wished her a blessed day, and exited  CRUZITO Montalvo (Music Therapist, Board Certified)  [id number] [facility]

## 2022-04-08 PROCEDURE — 0651 HSPC ROUTINE HOME CARE

## 2022-04-09 PROCEDURE — 0651 HSPC ROUTINE HOME CARE

## 2022-04-10 PROCEDURE — 0651 HSPC ROUTINE HOME CARE

## 2022-04-11 ENCOUNTER — HOME CARE VISIT (OUTPATIENT)
Dept: SCHEDULING | Facility: HOME HEALTH | Age: 87
End: 2022-04-11
Payer: MEDICARE

## 2022-04-11 ENCOUNTER — HOME CARE VISIT (OUTPATIENT)
Dept: HOSPICE | Facility: HOSPICE | Age: 87
End: 2022-04-11
Payer: MEDICARE

## 2022-04-11 VITALS
HEART RATE: 62 BPM | DIASTOLIC BLOOD PRESSURE: 76 MMHG | SYSTOLIC BLOOD PRESSURE: 148 MMHG | TEMPERATURE: 98.6 F | RESPIRATION RATE: 16 BRPM

## 2022-04-11 PROCEDURE — G0156 HHCP-SVS OF AIDE,EA 15 MIN: HCPCS

## 2022-04-11 PROCEDURE — G0299 HHS/HOSPICE OF RN EA 15 MIN: HCPCS

## 2022-04-11 PROCEDURE — 0651 HSPC ROUTINE HOME CARE

## 2022-04-11 NOTE — HOSPICE
Arrived to patients room and she was up in wheelchair. Drowsy during assessment but oriented to name as she she laughs and opens eyes when name is called. Patient rouses easily with verbal stimuli and light touch. Appetite waxes and wanes. Patients complaints were none. Respirations were unlabored. Lung were clear with diminished sounds in the bases. Last Bowel Movement [id number]. Heart rate regular, no edema noted, pulses palpable. No nonverbal signs of pain, shortness of breath or agitation. Skin intact and well moisturized. Medication reconciled with caregiver. Supplies ordered. Oxygen via nasal canula at 3 liters intatct. Max assist with activities of daily living and max assist with transfers and mobility. Told to contact hospice with questions, concerns or change in status. Son was called and updated.

## 2022-04-12 PROCEDURE — 0651 HSPC ROUTINE HOME CARE

## 2022-04-13 ENCOUNTER — HOME CARE VISIT (OUTPATIENT)
Dept: HOSPICE | Facility: HOSPICE | Age: 87
End: 2022-04-13
Payer: MEDICARE

## 2022-04-13 PROCEDURE — 0651 HSPC ROUTINE HOME CARE

## 2022-04-13 NOTE — HOSPICE
Routine follow up visit to provide comfort and spiritual guidance to the patient, family and caregiver as they make their journey towards end of life. Upon arrival, patient had her head down w/ the tv playing in the background. Facilitated music playing familiar hymns related to Holy Week and Easter: Ms Gloria Ramey began smiling and laughing engaging more w/ . Offered sung blessings. Overall patient coping well at this time.

## 2022-04-14 PROCEDURE — 0651 HSPC ROUTINE HOME CARE

## 2022-04-15 PROCEDURE — 0651 HSPC ROUTINE HOME CARE

## 2022-04-16 PROCEDURE — HOSPICE MEDICATION HC HH HOSPICE MEDICATION

## 2022-04-16 PROCEDURE — 0651 HSPC ROUTINE HOME CARE

## 2022-04-17 PROCEDURE — 0651 HSPC ROUTINE HOME CARE

## 2022-04-18 ENCOUNTER — HOME CARE VISIT (OUTPATIENT)
Dept: SCHEDULING | Facility: HOME HEALTH | Age: 87
End: 2022-04-18
Payer: MEDICARE

## 2022-04-18 ENCOUNTER — HOME CARE VISIT (OUTPATIENT)
Dept: HOSPICE | Facility: HOSPICE | Age: 87
End: 2022-04-18
Payer: MEDICARE

## 2022-04-18 PROCEDURE — G0156 HHCP-SVS OF AIDE,EA 15 MIN: HCPCS

## 2022-04-18 PROCEDURE — 0651 HSPC ROUTINE HOME CARE

## 2022-04-18 NOTE — HOSPICE
[facility] Therapy Visit    Date: 4/18/2022    Mental Status:   [x] Alert  [  ] Forgetful [x]  Confused  [  ] Minimally responsive  [  ] Sleeping    Communication Status: [  ] Impaired Speech [  ] Nonverbal -N/A    Physical Status:   [  ] Oxygen in use  [  ] Hard of Hearing [  ] Vision Impaired   [  ] Ambulatory  [  ] Ambulatory with assistance [  ] Non-ambulatory -N/A    Music Preferences, Background: Abdirahman     Clinical Problem addressed: Support stimulation, spiritual support    Goal(s) met in session:  Physical/Pain management (Scale of 1-10):    Pre-session rating: Pt didn't report on this  Post-session rating: Pt didn't report on this   [  ] Increased relaxation   [  ] Affected breathing patterns  [  ] Decreased muscle tension   [  ] Decreased agitation  [  ] Affected heart rate   [  ] Increased alertness     Emotional/Psychological:  [  ] Increased self-expression  [  ] Decreased aggressive behavior   [  ] Decreased feelings of stress  [  ] Discussed healthy coping skills     [  ] Improved mood   [x] Decreased withdrawn behavior     Social:  [  ] Decreased feelings of isolation/loneliness [x] Positive social interaction    [  ] Provided support and/or comfort for family/friends    Spiritual:  [x] Spiritual support    [  ] Expressed peace  [  ] Expressed harsh    [  ] Discussed beliefs    Techniques Utilized (Check all that apply):   [  ] Procedural support MT [  ] Music for relaxation [x] Patient preferred music  [  ] Beniat analysis  [  ] Song choice  [  ] Music for validation  [  ] Entrainment              [  ] Movement to music [  ] Guided visualization  [  ] [place] Lien  [  ] Patient instrument playing [  ] Alcus Grooms writing  [  ] Delene Pitcher along   [  ] Improvisation  [x] Sensory stimulation  [  ] Active Listening  [x] Music for spiritual support [  ] Making of CDs as gifts    Session Observations:  Patient (pt) was sitting in her chair with her eyes closed when this music therapist (MT) entered her bedroom.  MT greeted the pt by rubbing her arm and speaking her name. Pt increased alertness in response to this as evidenced by (AEB) opening her eyes, smiling, and saying \"hi\" in an upbeat tone. MT sat across from the pt, and began singing a hymn a capella, while tapping the tempo on her knees. Pt giggled in response to this, and appeared to focus her attention on the MT's guitar. MT began to incorporate the guitar, offering moments of playing the guitar on her lap for further sensory stimulation. Pt appeared to close her eyes and take a slow, deep breath in response to the guitar's vibrations. MT then sang and played additional hymns for the pt. Pt appeared to increase relaxation in response to the music AEB closing her eyes and falling asleep. MT attempted to wake her by tapping her knees along with the music, but she did not wake to this. MT concluded the session to respect the pt's rest, and exited.      EAGLE DoBC (Music Therapist, Board Certified)  [id number] [facility]

## 2022-04-19 PROCEDURE — 0651 HSPC ROUTINE HOME CARE

## 2022-04-20 PROCEDURE — 0651 HSPC ROUTINE HOME CARE

## 2022-04-21 ENCOUNTER — HOME CARE VISIT (OUTPATIENT)
Dept: HOSPICE | Facility: HOSPICE | Age: 87
End: 2022-04-21
Payer: MEDICARE

## 2022-04-21 VITALS
DIASTOLIC BLOOD PRESSURE: 73 MMHG | TEMPERATURE: 98.6 F | SYSTOLIC BLOOD PRESSURE: 131 MMHG | RESPIRATION RATE: 20 BRPM | HEART RATE: 66 BPM

## 2022-04-21 PROCEDURE — 0651 HSPC ROUTINE HOME CARE

## 2022-04-21 PROCEDURE — G0299 HHS/HOSPICE OF RN EA 15 MIN: HCPCS

## 2022-04-21 NOTE — HOSPICE
Arrived to patients room and she was sleeping in bed. Staff stating she was very drowsy this morning and feel she is getting sleepier overall. Drowsy during assessment but oriented to name. Needing some encouragement to eat. Patient rouses easily with verbal stimuli and light touch easily . Appetite waxes and wanes. Patients complaints were none. Fingernail care performed. Noted a reddened big toe to left and asked staff to keep shoes off until this resolves. Patient is chair fast. Respirations were unlabored. Lung were clear with diminished sounds in the bases. Last Bowel Movement [id number]. Heart rate regular, no edema noted, pulses palpable. No nonverbal signs of pain, shortness of breath or agitation. Skin well moisturized. Medication reconciled with caregiver. Supplies ordered. Oxygen via nasal canula at 3 liters intact. Max assist with activities of daily living and max assist with transfers and mobility. Told to contact hospice with questions, concerns or change in status. Son was called and updated.

## 2022-04-22 PROCEDURE — 0651 HSPC ROUTINE HOME CARE

## 2022-04-23 PROCEDURE — 0651 HSPC ROUTINE HOME CARE

## 2022-04-24 PROCEDURE — 0651 HSPC ROUTINE HOME CARE

## 2022-04-25 ENCOUNTER — HOME CARE VISIT (OUTPATIENT)
Dept: SCHEDULING | Facility: HOME HEALTH | Age: 87
End: 2022-04-25
Payer: MEDICARE

## 2022-04-25 PROCEDURE — G0156 HHCP-SVS OF AIDE,EA 15 MIN: HCPCS

## 2022-04-25 PROCEDURE — 0651 HSPC ROUTINE HOME CARE

## 2022-04-25 PROCEDURE — G0299 HHS/HOSPICE OF RN EA 15 MIN: HCPCS

## 2022-04-26 VITALS
HEART RATE: 68 BPM | DIASTOLIC BLOOD PRESSURE: 64 MMHG | TEMPERATURE: 99.6 F | RESPIRATION RATE: 18 BRPM | SYSTOLIC BLOOD PRESSURE: 145 MMHG

## 2022-04-26 PROCEDURE — 0651 HSPC ROUTINE HOME CARE

## 2022-04-26 NOTE — HOSPICE
Arrived to patients room and she was up in bed with a productive green cough. Lungs with rhonchi noted an temp on 99.4. Patient had been choking with meals at times. Anatoly Weems NP called and patient was started on Bactrim DS for 7 days. [facility] and paper copy given to nurse on staff. Drowsy during assessment. Patient rouses easily with verbal stimuli and light touch. Appetite has been good this week. Patients complaints were none. Last Bowel Movement 021914. Heart rate regular, no edema noted, pulses palpable. No nonverbal signs of pain or agitation. Skin well moisturized. Left great toe reddened. Shoes will remain off until healed. Medication reconciled with caregiver. Supplies ordered. last week. Oxygen via nasal canula at 3 liters intact. Ordered equipment from [employer] since current equipment in room is from [employer]. Max assist with activities of daily living and max assist with transfers and mobility. Told to contact hospice with questions, concerns or change in status. Son was called and updated.

## 2022-04-27 PROCEDURE — 0651 HSPC ROUTINE HOME CARE

## 2022-04-28 PROCEDURE — 0651 HSPC ROUTINE HOME CARE

## 2022-04-29 PROCEDURE — 0651 HSPC ROUTINE HOME CARE

## 2022-04-30 PROCEDURE — 0651 HSPC ROUTINE HOME CARE

## 2022-05-01 PROCEDURE — 0651 HSPC ROUTINE HOME CARE

## 2022-05-02 ENCOUNTER — HOME CARE VISIT (OUTPATIENT)
Dept: SCHEDULING | Facility: HOME HEALTH | Age: 87
End: 2022-05-02
Payer: MEDICARE

## 2022-05-02 VITALS
DIASTOLIC BLOOD PRESSURE: 50 MMHG | SYSTOLIC BLOOD PRESSURE: 100 MMHG | RESPIRATION RATE: 16 BRPM | TEMPERATURE: 98 F | HEART RATE: 70 BPM

## 2022-05-02 PROCEDURE — G0156 HHCP-SVS OF AIDE,EA 15 MIN: HCPCS

## 2022-05-02 PROCEDURE — G0299 HHS/HOSPICE OF RN EA 15 MIN: HCPCS

## 2022-05-02 PROCEDURE — 0651 HSPC ROUTINE HOME CARE

## 2022-05-02 NOTE — HOSPICE
Arrived to patients room and she was up in wheelchair in music therapy. Oriented to name with a few words of spontaneous speech. Joao Dunlap NP in for recert visit. Ordering skin barrier wipe (sure prep) to toes daily. Order given to nursing staff. Appetite waxes and wanes. MAC score down by .50. Patients complaints were none. Respirations were unlabored. Lung were clear with diminished sounds in the bases. Last Bowel Movement 05/02/22. Heart rate irregular, no edema noted, pulses palpable. No nonverbal signs of pain, shortness of breath or agitation. Skin intact and well moisturized. Medication reconciled with caregiver. Supplies ordered. Oxygen via nasal canula at 3 liters intact at time of departure. Max assist with activities of daily living and max assist with transfers and mobility. Told to contact hospice with questions, concerns or change in status. Son was called and updated.

## 2022-05-03 PROCEDURE — 0651 HSPC ROUTINE HOME CARE

## 2022-05-03 NOTE — HOSPICE
Corrected Missed visit documentation for visit attempt on 4/6. Patient was unavailable for visit due to getting her hair done at facility hair salon for hours. Hospice MD notified of missed visit. LMSW will continue to be available to provide support and address needs that arise.

## 2022-05-04 PROCEDURE — 0651 HSPC ROUTINE HOME CARE

## 2022-05-05 PROCEDURE — 0651 HSPC ROUTINE HOME CARE

## 2022-05-06 PROCEDURE — 0651 HSPC ROUTINE HOME CARE

## 2022-05-07 PROCEDURE — 0651 HSPC ROUTINE HOME CARE

## 2022-05-08 PROCEDURE — 0651 HSPC ROUTINE HOME CARE

## 2022-05-09 ENCOUNTER — HOME CARE VISIT (OUTPATIENT)
Dept: SCHEDULING | Facility: HOME HEALTH | Age: 87
End: 2022-05-09
Payer: MEDICARE

## 2022-05-09 ENCOUNTER — HOME CARE VISIT (OUTPATIENT)
Dept: HOSPICE | Facility: HOSPICE | Age: 87
End: 2022-05-09
Payer: MEDICARE

## 2022-05-09 VITALS
DIASTOLIC BLOOD PRESSURE: 84 MMHG | RESPIRATION RATE: 16 BRPM | SYSTOLIC BLOOD PRESSURE: 135 MMHG | TEMPERATURE: 97.6 F | HEART RATE: 84 BPM

## 2022-05-09 PROCEDURE — G0299 HHS/HOSPICE OF RN EA 15 MIN: HCPCS

## 2022-05-09 PROCEDURE — G0156 HHCP-SVS OF AIDE,EA 15 MIN: HCPCS

## 2022-05-09 PROCEDURE — 0651 HSPC ROUTINE HOME CARE

## 2022-05-09 NOTE — HOSPICE
Arrived to patients room and she was up bed eating breakfast. No signs of aspiration noted today. Oriented to name. Today she ate all of her breakfast. Appetite waxes and wanes. Patients complaints were none. Respirations were unlabored. Lung were clear with diminished sounds in the bases. Last Bowel Movement [id number]. Heart rate irregular, no edema noted, pulses palpable. No nonverbal signs of pain, shortness of breath or agitation. Skin well moisturized. Reddened toes have improved with barrier wipes daily to the pressure areas. Great toe is still reddened at the tip. Medication reconciled with caregiver. Supplies ordered last week. Oxygen via nasal canula at 3 liters intact. [employer] oxygen concentrator not in room. Gave serial number to nursing staff to locate. A [employer] concentrator was in room. Max assist with activities of daily living and max assist with transfers and mobility. Told to contact hospice with questions, concerns or change in status. Son was called and updated.

## 2022-05-10 ENCOUNTER — HOME CARE VISIT (OUTPATIENT)
Dept: HOSPICE | Facility: HOSPICE | Age: 87
End: 2022-05-10
Payer: MEDICARE

## 2022-05-10 ENCOUNTER — HOME CARE VISIT (OUTPATIENT)
Dept: SCHEDULING | Facility: HOME HEALTH | Age: 87
End: 2022-05-10
Payer: MEDICARE

## 2022-05-10 PROCEDURE — 0651 HSPC ROUTINE HOME CARE

## 2022-05-10 PROCEDURE — G0155 HHCP-SVS OF CSW,EA 15 MIN: HCPCS

## 2022-05-11 PROCEDURE — 0651 HSPC ROUTINE HOME CARE

## 2022-05-11 NOTE — HOSPICE
Routine follow up visit w/ social work to provide comfort and spiritual guidance to the patient, family and caregiver as they make their journey towards end of life. Upon arrival, Ms Elise Cali appeared somewhat tired and withdrawn. Engaged in music playing familiar hymns celebrating Easter and Mother's Day. As the visit progressed, patient became more verbal and connected to hospice team. Offered sung blessing and prayer. Overall, patient coping well at this time.

## 2022-05-11 NOTE — HOSPICE
LMSW and Nickolas Arvizu met with patient for joint visit for music-based socialization. LMSW and  povided hymns and support to patient who easily engaged with a few words and facial expressions. Patient was more alert this visit than previous visit. LMSW checked in with facility staff who reported no new needs or concerns. LMSW will continue to offer support and will be available to address needs that arise.

## 2022-05-12 PROCEDURE — 0651 HSPC ROUTINE HOME CARE

## 2022-05-13 ENCOUNTER — HOME CARE VISIT (OUTPATIENT)
Dept: SCHEDULING | Facility: HOME HEALTH | Age: 87
End: 2022-05-13
Payer: MEDICARE

## 2022-05-13 PROCEDURE — 0651 HSPC ROUTINE HOME CARE

## 2022-05-13 NOTE — HOSPICE
Nursing staff reporting small abrasion to elbow with a call to afterhours. Area is scabbed over to left elbow with no signs or symptoms of infections 2 cm x .50. No further treatment required at [facility] time.

## 2022-05-14 PROCEDURE — 0651 HSPC ROUTINE HOME CARE

## 2022-05-15 PROCEDURE — 0651 HSPC ROUTINE HOME CARE

## 2022-05-16 ENCOUNTER — HOME CARE VISIT (OUTPATIENT)
Dept: SCHEDULING | Facility: HOME HEALTH | Age: 87
End: 2022-05-16
Payer: MEDICARE

## 2022-05-16 PROCEDURE — G0156 HHCP-SVS OF AIDE,EA 15 MIN: HCPCS

## 2022-05-16 PROCEDURE — 0651 HSPC ROUTINE HOME CARE

## 2022-05-17 PROCEDURE — 0651 HSPC ROUTINE HOME CARE

## 2022-05-18 ENCOUNTER — HOME CARE VISIT (OUTPATIENT)
Dept: HOSPICE | Facility: HOSPICE | Age: 87
End: 2022-05-18
Payer: MEDICARE

## 2022-05-18 ENCOUNTER — HOME CARE VISIT (OUTPATIENT)
Dept: SCHEDULING | Facility: HOME HEALTH | Age: 87
End: 2022-05-18
Payer: MEDICARE

## 2022-05-18 VITALS
HEART RATE: 70 BPM | RESPIRATION RATE: 16 BRPM | SYSTOLIC BLOOD PRESSURE: 172 MMHG | OXYGEN SATURATION: 98 % | DIASTOLIC BLOOD PRESSURE: 85 MMHG | TEMPERATURE: 97.9 F

## 2022-05-18 PROCEDURE — 0651 HSPC ROUTINE HOME CARE

## 2022-05-18 PROCEDURE — G0300 HHS/HOSPICE OF LPN EA 15 MIN: HCPCS

## 2022-05-18 NOTE — HOSPICE
routine visit. On arrival patient sitting in wheelchair in room a&o to self. Patient is nonverbal.  Does not appear to be in pain. Facility staff stated patient is eating and taking in fluids adequately. Staff unable to state patient's last bowel movement. No supplies or medications needed.     Updated patient's son Soila Martinez via phone call

## 2022-05-19 PROCEDURE — 0651 HSPC ROUTINE HOME CARE

## 2022-05-20 PROCEDURE — 0651 HSPC ROUTINE HOME CARE

## 2022-05-21 PROCEDURE — 0651 HSPC ROUTINE HOME CARE

## 2022-05-22 PROCEDURE — 0651 HSPC ROUTINE HOME CARE

## 2022-05-23 ENCOUNTER — HOME CARE VISIT (OUTPATIENT)
Dept: SCHEDULING | Facility: HOME HEALTH | Age: 87
End: 2022-05-23
Payer: MEDICARE

## 2022-05-23 ENCOUNTER — HOME CARE VISIT (OUTPATIENT)
Dept: HOSPICE | Facility: HOSPICE | Age: 87
End: 2022-05-23
Payer: MEDICARE

## 2022-05-23 VITALS
RESPIRATION RATE: 18 BRPM | TEMPERATURE: 97.8 F | SYSTOLIC BLOOD PRESSURE: 137 MMHG | DIASTOLIC BLOOD PRESSURE: 80 MMHG | HEART RATE: 86 BPM

## 2022-05-23 PROCEDURE — G0299 HHS/HOSPICE OF RN EA 15 MIN: HCPCS

## 2022-05-23 PROCEDURE — 0651 HSPC ROUTINE HOME CARE

## 2022-05-23 PROCEDURE — G0156 HHCP-SVS OF AIDE,EA 15 MIN: HCPCS

## 2022-05-23 NOTE — HOSPICE
Arrived to patients room and she was up in bed with music therapy. Drowsy during assessment but oriented to name and responds to light touch. Appetite waxes and wanes. Patients complaints were none. Respirations were unlabored. Lung were clear with diminished sounds in the bases. Last Bowel Movement [id number]. No edema noted, pulses palpable. No nonverbal signs of pain, shortness of breath or agitation. delphine a 3 x 2.5 red area to top of left foot. Not worm to touch or painful. Does not appear to have fluid. Contacted Jade Tello NP and nursing instructed to keep shoe off and monitor for any worsening. Supplies ordered. Oxygen via nasal canula at 3 liters intact. Max assist with activities of daily living and max assist with transfers and mobility. Told to contact hospice with questions, concerns or change in status.  Son was called and updated

## 2022-05-23 NOTE — HOSPICE
Music Therapy Visit    Date: 5/23/22    Mental Status:   [x] Alert  [  ] Forgetful [x]  Confused  [  ] Minimally responsive  [  ] Sleeping    Communication Status: [  ] Impaired Speech [  ] Nonverbal -N/A    Physical Status:   [  ] Oxygen in use  [  ] Hard of Hearing [  ] Vision Impaired   [  ] Ambulatory  [  ] Ambulatory with assistance [  ] Non-ambulatory -N/A    Music Preferences, Background: Traditional hymns    Clinical Problem addressed: Spiritual support and increased socialization    Goal(s) met in session:  Physical/Pain management (Scale of 1-10):    Pre-session rating: N/A   Post-session rating: N/A  [  ] Increased relaxation   [  ] Affected breathing patterns  [  ] Decreased muscle tension   [  ] Decreased agitation  [  ] Affected heart rate   [x] Increased alertness     Emotional/Psychological:  [  ] Increased self-expression   [  ] Decreased aggressive behavior   [  ] Decreased feelings of stress  [  ] Discussed healthy coping skills     [  ] Improved mood    [  ] Decreased withdrawn behavior     Social:  [  ] Decreased feelings of isolation/loneliness [x] Positive social interaction    [  ] Provided support and/or comfort for family/friends    Spiritual:  [x] Spiritual support    [  ] Expressed peace  [  ] Expressed harsh    [  ] Discussed beliefs    Techniques Utilized (Check all that apply):   [  ] Procedural support MT [x] Music for relaxation [x] Patient preferred music  [  ] Benita analysis  [  ] Song choice  [  ] Music for validation  [  ] Entrainment              [  ] Movement to music [  ] Guided visualization  [  ] Duy Barclay  [  ] Patient instrument playing [  ] Maria Luisa Webster writing  [  ] Jordy Wilson along   [  ] Improvisation  [x] Sensory stimulation  [  ] Active Listening  [x] Music for spiritual support [  ] Making of CDs as gifts    Session Observations:  Patient (pt) was lying in bed, asleep when this music therapist (MT) entered the room.  MT greeted her by lightly rubbing her arm and speaking her name. Pt appeared to open her eyes and smile as she made eye-contact with this MT. MT engaged in conversation with her, sharing why the MT was visiting and the various music selections they'll provide during their visit. MT then provided various hymns with guitar and vocals at an upbeat tempo. Throughout the visit pt would fall asleep off and on, but would awake when this MT would speak her name and offer moments of sensory stimulation (rubbing her arm, tapping the tempo of the music on her arms, and providing gentle squeezes along with the music). MT also attempted to increase the pt's alertness by adjusting the tempo/volume of the music, and alternating between strumming with fingers and a guitar pick. As the session continued, pt's RN (Vani) entered the room to provide care. MT discussed the pt with Vani briefly, and provided additional hymns before concluding the session. MT concluded the session with the hymn \"God Be With You Till We Meet Again\" and exited.      Larissa Huff, MT-BC (Music Therapist, Board Certified)  [id number] [facility]

## 2022-05-24 PROCEDURE — 0651 HSPC ROUTINE HOME CARE

## 2022-05-25 PROCEDURE — 0651 HSPC ROUTINE HOME CARE

## 2022-05-26 PROCEDURE — 0651 HSPC ROUTINE HOME CARE

## 2022-05-27 ENCOUNTER — HOME CARE VISIT (OUTPATIENT)
Dept: HOSPICE | Facility: HOSPICE | Age: 87
End: 2022-05-27
Payer: MEDICARE

## 2022-05-27 PROCEDURE — 0651 HSPC ROUTINE HOME CARE

## 2022-05-28 PROCEDURE — 0651 HSPC ROUTINE HOME CARE

## 2022-05-29 PROCEDURE — 0651 HSPC ROUTINE HOME CARE

## 2022-05-30 PROCEDURE — 0651 HSPC ROUTINE HOME CARE

## 2022-05-31 ENCOUNTER — HOME CARE VISIT (OUTPATIENT)
Dept: HOSPICE | Facility: HOSPICE | Age: 87
End: 2022-05-31
Payer: MEDICARE

## 2022-05-31 PROCEDURE — G0299 HHS/HOSPICE OF RN EA 15 MIN: HCPCS

## 2022-05-31 PROCEDURE — 0651 HSPC ROUTINE HOME CARE

## 2022-06-01 PROCEDURE — 0651 HSPC ROUTINE HOME CARE

## 2022-06-02 ENCOUNTER — HOME CARE VISIT (OUTPATIENT)
Dept: SCHEDULING | Facility: HOME HEALTH | Age: 87
End: 2022-06-02
Payer: MEDICARE

## 2022-06-02 ENCOUNTER — HOME CARE VISIT (OUTPATIENT)
Dept: HOSPICE | Facility: HOSPICE | Age: 87
End: 2022-06-02
Payer: MEDICARE

## 2022-06-02 PROCEDURE — 0651 HSPC ROUTINE HOME CARE

## 2022-06-02 PROCEDURE — G0155 HHCP-SVS OF CSW,EA 15 MIN: HCPCS

## 2022-06-02 NOTE — HOSPICE
LMSW attended care plan meeting with patient's daughter in law Sherie Kessler, JEFF Ludwig, and Migel Garcia including the ,  and intern, nurse supervisor, and MDS coordinator. All present discussed patient's plan of care and current needs. Sherie Kessler stated that patient has lost her glasses. JEFF Ludwig stated that pateint's son is looking into whether he can find the patient's eye glass prescription to have a new pair made. Sherie Kessler stated that [facility] wants to have an exam prior to renewing prescription. LMSW stated that if patient's son is unsuccessful, perhaps hospice MD can write a letter explaining patient's inability to attend eye exam. Sherie Kessler stated that patient's sons from [place] and [place] are coming to see patient in two weeks. LMSW will continue to offer support and address needs that arise.

## 2022-06-02 NOTE — HOSPICE
Care plan meeting with daughter in [place] today performed with [facility] and LATANYA. No changes in plan of care at this time.

## 2022-06-03 PROCEDURE — 0651 HSPC ROUTINE HOME CARE

## 2022-06-04 PROCEDURE — 0651 HSPC ROUTINE HOME CARE

## 2022-06-05 PROCEDURE — 0651 HSPC ROUTINE HOME CARE

## 2022-06-06 ENCOUNTER — HOME CARE VISIT (OUTPATIENT)
Dept: HOSPICE | Facility: HOSPICE | Age: 87
End: 2022-06-06
Payer: MEDICARE

## 2022-06-06 ENCOUNTER — HOME CARE VISIT (OUTPATIENT)
Dept: SCHEDULING | Facility: HOME HEALTH | Age: 87
End: 2022-06-06
Payer: MEDICARE

## 2022-06-06 PROCEDURE — 0651 HSPC ROUTINE HOME CARE

## 2022-06-07 ENCOUNTER — HOME CARE VISIT (OUTPATIENT)
Dept: SCHEDULING | Facility: HOME HEALTH | Age: 87
End: 2022-06-07
Payer: MEDICARE

## 2022-06-07 PROCEDURE — G0299 HHS/HOSPICE OF RN EA 15 MIN: HCPCS

## 2022-06-07 PROCEDURE — 0651 HSPC ROUTINE HOME CARE

## 2022-06-08 VITALS
OXYGEN SATURATION: 98 % | DIASTOLIC BLOOD PRESSURE: 80 MMHG | TEMPERATURE: 98.3 F | HEART RATE: 86 BPM | SYSTOLIC BLOOD PRESSURE: 140 MMHG | RESPIRATION RATE: 18 BRPM

## 2022-06-08 PROCEDURE — 0651 HSPC ROUTINE HOME CARE

## 2022-06-08 NOTE — HOSPICE
Routine hospice SN visit  Patient received sitting up in chair at the bedside awake with flat expression  Rarely makes eye contact and does not seem to comprehend any verbal stimuli  No S/S of any pain or shortness of breath on continuous nasal 02 3l via nasal cannula  Patient is non verbal mostly   Staff reports no new issues since last hospice visit  Patient continues to have a good appetite  No swallowing issues   Regular bms   Reddened areas on top of toes improving  Staff applying skin prep    To continue with current hospice plan of care   Informed staff to call hospice with concerns / needs    Call placed to patient's son, Debbi Brownlee,  with update on today's visit

## 2022-06-09 PROCEDURE — 0651 HSPC ROUTINE HOME CARE

## 2022-06-10 PROCEDURE — 0651 HSPC ROUTINE HOME CARE

## 2022-06-11 PROCEDURE — 0651 HSPC ROUTINE HOME CARE

## 2022-06-12 PROCEDURE — 0651 HSPC ROUTINE HOME CARE

## 2022-06-13 ENCOUNTER — HOME CARE VISIT (OUTPATIENT)
Dept: HOSPICE | Facility: HOSPICE | Age: 87
End: 2022-06-13
Payer: MEDICARE

## 2022-06-13 ENCOUNTER — HOME CARE VISIT (OUTPATIENT)
Dept: SCHEDULING | Facility: HOME HEALTH | Age: 87
End: 2022-06-13
Payer: MEDICARE

## 2022-06-13 PROCEDURE — 0651 HSPC ROUTINE HOME CARE

## 2022-06-13 PROCEDURE — G0156 HHCP-SVS OF AIDE,EA 15 MIN: HCPCS

## 2022-06-13 PROCEDURE — G0299 HHS/HOSPICE OF RN EA 15 MIN: HCPCS

## 2022-06-13 NOTE — HOSPICE
Arrived to patients room and she was up in wheelchair. Drowsy during assessment but responded to name. Patient rouses easily with verbal stimuli and light touch. Appetite waxes and wanes. Patients complaints were none. Respirations were unlabored. Lung were clear with diminished sounds in the bases. Bowel Sounds active. Heart rate regular, no edema noted, pulses palpable. No nonverbal signs of pain, shortness of breath or agitation. Skin well moisturized. Right top of foot healed. Toes much emproved wth reddness noted only to right big toe. Medication reconciled with caregiver. Supplies ordered last week. Oxygen via nasal canula at 3 liters intatct. Max assist with activities of daily living and max assist with transfers and mobility. Told to contact hospice with questions, concerns or change in status. Son was called and updated. He reported patient will have new glasses this week.

## 2022-06-13 NOTE — HOSPICE
Music Therapy Visit    Date: 6/13/22    Mental Status:   [x] Alert-at times   [  ] Olya Ogden [  ]  Confused  [  ] Minimally responsive  [x] Sleeping    Communication Status: [  ] Impaired Speech [  ] Nonverbal -N/A    Physical Status:   [x] Oxygen in use  [  ] Hard of Hearing [  ] Vision Impaired   [  ] Ambulatory  [  ] Ambulatory with assistance [  ] Non-ambulatory     Music Preferences, Background: Traditional hymns     Clinical Problem addressed: Spiritual support and increase socialization     Goal(s) met in session:  Physical/Pain management (Scale of 1-10):    Pre-session rating: N/A  Post-session rating: N/A  [  ] Increased relaxation   [  ] Affected breathing patterns  [  ] Decreased muscle tension   [  ] Decreased agitation  [  ] Affected heart rate    [  ] Increased alertness     Emotional/Psychological:  [  ] Increased self-expression   [  ] Decreased aggressive behavior   [  ] Decreased feelings of stress  [  ] Discussed healthy coping skills     [  ] Improved mood    [  ] Decreased withdrawn behavior     Social:  [  ] Decreased feelings of isolation/loneliness [x] Positive social interaction    [  ] Provided support and/or comfort for family/friends    Spiritual:  [x] Spiritual support    [  ] Expressed peace  [  ] Expressed harsh    [  ] Discussed beliefs    Techniques Utilized (Check all that apply):   [  ] Procedural support MT [  ] Music for relaxation [x] Patient preferred music  [  ] Benita analysis  [  ] Song choice  [  ] Music for validation  [  ] Entrainment  [  ] Movement to music [  ] Guided visualization  [  ] Gisel Martinez  [  ] Patient instrument playing [  ] [employer] writing  [  ] Lico Green along   [  ] Improvisation  [x] Sensory stimulation  [  ] Active Listening  [x] Music for spiritual support [  ] Making of CDs as gifts    Session Observations: Patient (pt) was sitting in her wheelchair, sleeping when this music therapist (MT) entered her room.  MT greeted her by gently rubbing her arm and speaking her name. As she awoke to this, she smiled and laughed at this MT. MT sat in front of her during this time and provided various hymns with vocals and guitar at an upbeat tempo. Pt remained awake during the first 2 songs, but eventually fell back asleep. MT provided sensory stimulation by singing a capella and lightly tapping/squeezing her arms along with the tempo, in attempt to awake her. Pt responded to this by opening her eyes briefly, but appeared to close them again. MT reincorporated the guitar, and adjusted the volume and tempo during the remainder of the session, in hopes to awake her again. Pt appeared to open her eyes slightly, but did not respond to this MT. MT concluded the session at this time and exited to respect the pt's space.      CRUZITO Del Valle (Music Therapist, Board Certified)  [id number] [facility]

## 2022-06-14 ENCOUNTER — HOME CARE VISIT (OUTPATIENT)
Dept: HOSPICE | Facility: HOSPICE | Age: 87
End: 2022-06-14
Payer: MEDICARE

## 2022-06-14 ENCOUNTER — HOME CARE VISIT (OUTPATIENT)
Dept: SCHEDULING | Facility: HOME HEALTH | Age: 87
End: 2022-06-14
Payer: MEDICARE

## 2022-06-14 PROCEDURE — G0155 HHCP-SVS OF CSW,EA 15 MIN: HCPCS

## 2022-06-14 PROCEDURE — 0651 HSPC ROUTINE HOME CARE

## 2022-06-15 PROCEDURE — 0651 HSPC ROUTINE HOME CARE

## 2022-06-16 PROCEDURE — 0651 HSPC ROUTINE HOME CARE

## 2022-06-16 NOTE — HOSPICE
LMSW and Deion Joshi met with patient who was seated in her wheelchair in her room. LMSW provided socialization through comforting presence and soothing touch. Patient engaged with eye contact.  provided scripture reading and prayer. Patient was lethargic. LMSW checked in with facility staff who reported no additional needs or concerns. LMSW will continue to offer support and address needs that arise.

## 2022-06-16 NOTE — HOSPICE
Routine follow up joint visit w/ social work to provide comfort and spiritual guidance to the patient, family and caregiver as they make their journey towards end of life. Upon arrival, patient was seated in her wheelchair and appeared sleepy. Offered empathic presence, read Scripture meditating on God's unconditional love holding patient as a [place] looking after lambs; provided prayer according to Ms Tara De Los Santos.

## 2022-06-17 PROCEDURE — 0651 HSPC ROUTINE HOME CARE

## 2022-06-18 PROCEDURE — 0651 HSPC ROUTINE HOME CARE

## 2022-06-19 PROCEDURE — 0651 HSPC ROUTINE HOME CARE

## 2022-06-20 ENCOUNTER — HOME CARE VISIT (OUTPATIENT)
Dept: SCHEDULING | Facility: HOME HEALTH | Age: 87
End: 2022-06-20
Payer: MEDICARE

## 2022-06-20 VITALS
SYSTOLIC BLOOD PRESSURE: 142 MMHG | HEART RATE: 74 BPM | TEMPERATURE: 97.5 F | DIASTOLIC BLOOD PRESSURE: 70 MMHG | RESPIRATION RATE: 16 BRPM

## 2022-06-20 PROCEDURE — G0299 HHS/HOSPICE OF RN EA 15 MIN: HCPCS

## 2022-06-20 PROCEDURE — G0156 HHCP-SVS OF AIDE,EA 15 MIN: HCPCS

## 2022-06-20 PROCEDURE — 0651 HSPC ROUTINE HOME CARE

## 2022-06-20 NOTE — HOSPICE
Arrived to patients room and she was up in wheelchair. Drowsy during assessment but oriented to name as she responds when called. Patient rouses easily with verbal stimuli and light touch. Appetite waxes and wanes. Patients complaints were none. Respirations were unlabored. Lung were clear with diminished sounds in the bases. Last Bowel Movement [id number]. Heart rate regular, no edema noted, pulses palpable. No nonverbal signs of pain, shortness of breath or agitation. Toe and foot reddress much improved. Noted small scab to right middle finger. Patient has begun to hold hand in a fist like position. Rolled wash cloths put to bilteral bands and encourage nuring staff to continue this. Medication reconciled with caregiver. Supplies ordered. Oxygen via nasal canula at 3 liters intatct. Max assist with activities of daily living and max assist with transfers and mobility. Told to contact hospice with questions, concerns or change in status. Son was called and updated.

## 2022-06-21 PROCEDURE — 0651 HSPC ROUTINE HOME CARE

## 2022-06-22 PROCEDURE — 0651 HSPC ROUTINE HOME CARE

## 2022-06-23 PROCEDURE — 0651 HSPC ROUTINE HOME CARE

## 2022-06-24 PROCEDURE — 0651 HSPC ROUTINE HOME CARE

## 2022-06-25 PROCEDURE — 0651 HSPC ROUTINE HOME CARE

## 2022-06-26 PROCEDURE — 0651 HSPC ROUTINE HOME CARE

## 2022-06-27 ENCOUNTER — HOME CARE VISIT (OUTPATIENT)
Dept: HOSPICE | Facility: HOSPICE | Age: 87
End: 2022-06-27
Payer: MEDICARE

## 2022-06-27 ENCOUNTER — HOME CARE VISIT (OUTPATIENT)
Dept: SCHEDULING | Facility: HOME HEALTH | Age: 87
End: 2022-06-27
Payer: MEDICARE

## 2022-06-27 VITALS — HEART RATE: 80 BPM | TEMPERATURE: 97.9 F | RESPIRATION RATE: 16 BRPM

## 2022-06-27 PROCEDURE — G0156 HHCP-SVS OF AIDE,EA 15 MIN: HCPCS

## 2022-06-27 PROCEDURE — G0299 HHS/HOSPICE OF RN EA 15 MIN: HCPCS

## 2022-06-27 PROCEDURE — 0651 HSPC ROUTINE HOME CARE

## 2022-06-27 NOTE — HOSPICE
Arrived to patients room and she was up in wheelchair. Drowsy during assessment but oriented to name as she responed when addressed. F2f performed bt Elin Islas NP. Patient alsp rouses easily with verbal light touch. Appetite waxes and wanes. Patients complaints were none. Respirations were unlabored. Lung were clear with diminished sounds in the bases. Last Bowel Movement [id number]. No edema noted, pulses palpable. No nonverbal signs of pain, shortness of breath or agitation. Skin intact and well moisturized. Reddness to toes and heels healed. Medication reconciled with caregiver. Supplies ordered last week. Oxygen via nasal canula at 3 liters intatct. Max assist with activities of daily living and max assist with transfers and mobility. Told to contact hospice with questions, concerns or change in status. Son was called with update.

## 2022-06-28 PROCEDURE — 0651 HSPC ROUTINE HOME CARE

## 2022-06-29 PROCEDURE — 0651 HSPC ROUTINE HOME CARE

## 2022-06-30 PROCEDURE — 0651 HSPC ROUTINE HOME CARE

## 2022-07-01 PROCEDURE — 0651 HSPC ROUTINE HOME CARE

## 2022-07-02 PROCEDURE — 0651 HSPC ROUTINE HOME CARE

## 2022-07-03 PROCEDURE — 0651 HSPC ROUTINE HOME CARE

## 2022-07-04 ENCOUNTER — HOME CARE VISIT (OUTPATIENT)
Dept: HOSPICE | Facility: HOSPICE | Age: 87
End: 2022-07-04
Payer: MEDICARE

## 2022-07-04 PROCEDURE — G0299 HHS/HOSPICE OF RN EA 15 MIN: HCPCS

## 2022-07-04 PROCEDURE — 0651 HSPC ROUTINE HOME CARE

## 2022-07-05 VITALS
HEART RATE: 68 BPM | TEMPERATURE: 98.2 F | DIASTOLIC BLOOD PRESSURE: 81 MMHG | SYSTOLIC BLOOD PRESSURE: 123 MMHG | RESPIRATION RATE: 18 BRPM

## 2022-07-05 PROCEDURE — 0651 HSPC ROUTINE HOME CARE

## 2022-07-05 NOTE — HOSPICE
Arrived to patients room and she was up in wheelchair. Drowsy during assessment but oriented to name. Patient rouses easily with verbal stimuli and light touch. Appetite is good. Patients complaints were none. Respirations were unlabored. Lung were clear with diminished sounds in the bases. Last Bowel Movement [id number]. No edema noted, pulses palpable. No nonverbal signs of pain, shortness of breath or agitation. Skin intact and well moisturized. Hands are in fist position. Massaged, cleansed and mositurized. Rolled wash clothes place. Medication reconciled with caregiver. Supplies ordered. Oxygen via nasal canula at 3 liters intatct. Max assist with activities of daily living and max assist with transfers and mobility. Told to contact hospice with questions, concerns or change in status. Son was called and updated.

## 2022-07-06 ENCOUNTER — HOME CARE VISIT (OUTPATIENT)
Dept: HOSPICE | Facility: HOSPICE | Age: 87
End: 2022-07-06
Payer: MEDICARE

## 2022-07-06 PROCEDURE — 0651 HSPC ROUTINE HOME CARE

## 2022-07-07 PROCEDURE — 0651 HSPC ROUTINE HOME CARE

## 2022-07-08 PROCEDURE — 0651 HSPC ROUTINE HOME CARE

## 2022-07-09 PROCEDURE — 0651 HSPC ROUTINE HOME CARE

## 2022-07-10 ENCOUNTER — HOME CARE VISIT (OUTPATIENT)
Dept: HOSPICE | Facility: HOSPICE | Age: 87
End: 2022-07-10
Payer: MEDICARE

## 2022-07-10 PROCEDURE — 0651 HSPC ROUTINE HOME CARE

## 2022-07-11 ENCOUNTER — HOME CARE VISIT (OUTPATIENT)
Dept: SCHEDULING | Facility: HOME HEALTH | Age: 87
End: 2022-07-11
Payer: MEDICARE

## 2022-07-11 ENCOUNTER — HOME CARE VISIT (OUTPATIENT)
Dept: HOSPICE | Facility: HOSPICE | Age: 87
End: 2022-07-11
Payer: MEDICARE

## 2022-07-11 PROCEDURE — 0651 HSPC ROUTINE HOME CARE

## 2022-07-11 PROCEDURE — G0299 HHS/HOSPICE OF RN EA 15 MIN: HCPCS

## 2022-07-11 NOTE — HOSPICE
Music Therapy Visit    Date: 7/11/22    Mental Status:   [x] Alert -at times [  ] Forgetful [x]  Confused  [  ] Minimally responsive  [x] Sleeping - at times     Communication Status: [  ] Impaired Speech [  ] Nonverbal -N/A    Physical Status:   [x] Oxygen in use  [  ] Hard of Hearing [  ] Vision Impaired   [  ] Ambulatory  [  ] Ambulatory with assistance [  ] Non-ambulatory     Music Preferences, Background: Traditional hymns     Clinical Problem addressed: Increase alertness and spiritual support     Goal(s) met in session:  Physical/Pain management (Scale of 1-10):    Pre-session rating: Pt didn't report on this  Post-session rating: Pt didn't report on this  [x] Increased relaxation   [  ] Affected breathing patterns  [  ] Decreased muscle tension   [  ] Decreased agitation  [  ] Affected heart rate   [x] Increased alertness- briefly    Emotional/Psychological:  [  ] Increased self-expression  [  ] Decreased aggressive behavior   [  ] Decreased feelings of stress  [  ] Discussed healthy coping skills     [  ] Improved mood   [  ] Decreased withdrawn behavior     Social:  [  ] Decreased feelings of isolation/loneliness [  ] Positive social interaction    [  ] Provided support and/or comfort for family/friends    Spiritual:  [x] Spiritual support    [  ] Expressed peace  [  ] Expressed harsh    [  ] Discussed beliefs    Techniques Utilized (Check all that apply):   [  ] Procedural support MT [  ] Music for relaxation [x] Patient preferred music  [  ] Benita analysis  [  ] Song choice  [  ] Music for validation  [  ] Entrainment              [  ] Movement to music [  ] Guided visualization  [x] ISO Principle              [  ] Patient instrument playing [  ] Celena Duck writing  [  ] Jacinto Ramp along   [  ] Improvisation  [x] Sensory stimulation  [  ] Active Listening  [x] Music for spiritual support [  ] Making of CDs as gifts    Session Observations: Patient (pt) was sitting in her wheelchair, asleep as this music therapist (MT) entered her room. MT greeted her by gently rubbing her back and speaking her name. Though pt's eyes remained closed during this, she appeared to smile and giggle in response to this. MT explained what to expect during the session as they sat in front of her and set up music therapy gear. MT began to sing and play various songs/hymns with guitar while utilizing the ISO Principle in hopes to increase the pt's alertness (meeting the pt's energy where they're at and gradually increasing the music/tempo). Pt did not respond to this as evidenced by (AEB) keeping her eyes closed and remaning asleep. MT removed the guitar and began to sing a capella, while incorporating moments of sensory stimulation (providing gentle squeezes on her arms, rubbing her arms, etc.). Pt briefly increased alertness in response to this as evidenced by (AEB) opening her eyes and smiling at this MT. Pt was able to remain awake for half a song, but eventually closed her eyes again. MT continued to provide music, both with and without guitar, while engaging in conversation over various topics with the pt. After observing the pt was no longer responding to the music, MT concluded the session with \"The Lords Prayer\" and \"God Be With You Till We Meet Again\".  MT exited quietly to respect the pt's space and rest.    Francia An MT-BC (Music Therapist, Board Certified)  [id number] [facility]

## 2022-07-12 VITALS
RESPIRATION RATE: 18 BRPM | SYSTOLIC BLOOD PRESSURE: 132 MMHG | DIASTOLIC BLOOD PRESSURE: 90 MMHG | HEART RATE: 80 BPM | TEMPERATURE: 98.1 F

## 2022-07-12 PROCEDURE — 0651 HSPC ROUTINE HOME CARE

## 2022-07-12 NOTE — HOSPICE
Arrived to patients room and she was up in wheelchair. Drowsy during assessment but oriented to name as she she laughs and opens eyes when name is called. Patient rouses easily with verbal stimuli and light touch. Appetite waxes and wanes. Patients complaints were none. Received call over the weekend that she had a boil that was drained following warm compresses to top of right arm near axilla. No drainage noted today. Sulma Kong NP called and antibiotic was ordered and wound care. area cleansed with wound cleanser and optifoam applied. NO redness or warmth to touch and patient is afebrile today. Has reddened wrist today to that side. Photos sent to NP. Respirations were unlabored. Lung were clear with diminished sounds in the bases. Last Bowel Movement [id number]. Heart rate regular, no edema noted, pulses palpable. No nonverbal signs of pain, shortness of breath or agitation. Skin well moisturized. Medication reconciled with caregiver. Supplies ordered last week and received. Antibiotic order called to [facility] pharmacist at [address] [facility]. Oxygen via nasal canula at 3 liters intact. Max assist with activities of daily living and max assist with transfers and mobility. Told to contact hospice with questions, concerns or change in status. Son was called and updated.

## 2022-07-13 ENCOUNTER — HOME CARE VISIT (OUTPATIENT)
Dept: HOSPICE | Facility: HOSPICE | Age: 87
End: 2022-07-13
Payer: MEDICARE

## 2022-07-13 PROCEDURE — 0651 HSPC ROUTINE HOME CARE

## 2022-07-14 PROCEDURE — 0651 HSPC ROUTINE HOME CARE

## 2022-07-15 PROCEDURE — 0651 HSPC ROUTINE HOME CARE

## 2022-07-16 PROCEDURE — 0651 HSPC ROUTINE HOME CARE

## 2022-07-17 PROCEDURE — 0651 HSPC ROUTINE HOME CARE

## 2022-07-18 ENCOUNTER — HOME CARE VISIT (OUTPATIENT)
Dept: SCHEDULING | Facility: HOME HEALTH | Age: 87
End: 2022-07-18
Payer: MEDICARE

## 2022-07-18 VITALS
DIASTOLIC BLOOD PRESSURE: 69 MMHG | TEMPERATURE: 97.8 F | RESPIRATION RATE: 18 BRPM | HEART RATE: 73 BPM | SYSTOLIC BLOOD PRESSURE: 121 MMHG

## 2022-07-18 PROCEDURE — G0299 HHS/HOSPICE OF RN EA 15 MIN: HCPCS

## 2022-07-18 PROCEDURE — 0651 HSPC ROUTINE HOME CARE

## 2022-07-18 NOTE — HOSPICE
Arrived to patients room and she was sleeping in bed. Drowsy during assessment but oriented to name as she she laughs and opens eyes when name is called. Patient rouses easily with verbal stimuli and light touch. Appetite waxes and wanes. Podiatry in for toenails. Patients complaints were none. Respirations were unlabored. Lung were clear with diminished sounds in the bases. Last Bowel Movement reported yesterday. Heart rate regular, no edema noted, pulses palpable. No nonverbal signs of pain, shortness of breath or agitation. Skin with small amount of yellow/bloody drainage to boil under right upper arm. 1/2 cm pea to the area under skin. Warm compresses ordered by Apolonia Loera NP to assist with drainage. Medication reconciled with caregiver. Supplies to be ordered. Oxygen via nasal canula at 3 liters intact. Max assist with activities of daily living and max assist with transfers and mobility. Wash cloths to bilateral hands intact. Told to contact hospice with questions, concerns or change in status. Son was called and updated.

## 2022-07-19 PROCEDURE — 0651 HSPC ROUTINE HOME CARE

## 2022-07-20 ENCOUNTER — HOME CARE VISIT (OUTPATIENT)
Dept: SCHEDULING | Facility: HOME HEALTH | Age: 87
End: 2022-07-20
Payer: MEDICARE

## 2022-07-20 PROCEDURE — 0651 HSPC ROUTINE HOME CARE

## 2022-07-20 PROCEDURE — G0156 HHCP-SVS OF AIDE,EA 15 MIN: HCPCS

## 2022-07-21 ENCOUNTER — HOME CARE VISIT (OUTPATIENT)
Dept: SCHEDULING | Facility: HOME HEALTH | Age: 87
End: 2022-07-21
Payer: MEDICARE

## 2022-07-21 PROCEDURE — 0651 HSPC ROUTINE HOME CARE

## 2022-07-21 PROCEDURE — G0155 HHCP-SVS OF CSW,EA 15 MIN: HCPCS

## 2022-07-22 ENCOUNTER — HOME CARE VISIT (OUTPATIENT)
Dept: HOSPICE | Facility: HOSPICE | Age: 87
End: 2022-07-22
Payer: MEDICARE

## 2022-07-22 PROCEDURE — 0651 HSPC ROUTINE HOME CARE

## 2022-07-22 NOTE — HOSPICE
LMSW met with patient who was resting in her room. LMSW provided music-based socialization to patient through calming hymns. Patient smiled and her facial expression relaxed. Patient's eyes remained closed throughout visit. LMSW checked in with facility staff who reported no new needs or concerns. LMSW will continue to offer support and will remain available for needs that arise.

## 2022-07-23 PROCEDURE — 0651 HSPC ROUTINE HOME CARE

## 2022-07-24 PROCEDURE — 0651 HSPC ROUTINE HOME CARE

## 2022-07-25 ENCOUNTER — HOME CARE VISIT (OUTPATIENT)
Dept: SCHEDULING | Facility: HOME HEALTH | Age: 87
End: 2022-07-25
Payer: MEDICARE

## 2022-07-25 VITALS
TEMPERATURE: 98.6 F | DIASTOLIC BLOOD PRESSURE: 88 MMHG | RESPIRATION RATE: 18 BRPM | HEART RATE: 70 BPM | SYSTOLIC BLOOD PRESSURE: 138 MMHG

## 2022-07-25 PROCEDURE — 0651 HSPC ROUTINE HOME CARE

## 2022-07-25 PROCEDURE — G0299 HHS/HOSPICE OF RN EA 15 MIN: HCPCS

## 2022-07-25 PROCEDURE — G0156 HHCP-SVS OF AIDE,EA 15 MIN: HCPCS

## 2022-07-26 PROCEDURE — 0651 HSPC ROUTINE HOME CARE

## 2022-07-26 NOTE — HOSPICE
Arrived to patients room and she was up in bed sleeping. Drowsy during visit. Snoring at times. Patient rouses easily with verbal stimuli and light touch. Appetite waxes and wanes. Patients complaints were none. Respirations were unlabored. Lung were clear with diminished sounds in the bases. Last Bowel Movement [id number]. No edema noted, pedal pulses palpable. No nonverbal signs of pain, shortness of breath or agitation. Small amount of yellow drainage noted to right upper inner arm. Cleansed with wound cleanser and clean dry dressing applied. Wound base pink. No redness or odor. Medication reconciled with caregiver. Oxygen via nasal canula at 3 liters intact. Max assist with activities of daily living and max assist with transfers and mobility. Told to contact hospice with questions, concerns or change in status. Son was called and updated.

## 2022-07-27 PROCEDURE — 0651 HSPC ROUTINE HOME CARE

## 2022-07-28 PROCEDURE — 0651 HSPC ROUTINE HOME CARE

## 2022-07-29 ENCOUNTER — HOME CARE VISIT (OUTPATIENT)
Dept: HOSPICE | Facility: HOSPICE | Age: 87
End: 2022-07-29
Payer: MEDICARE

## 2022-07-29 PROCEDURE — 0651 HSPC ROUTINE HOME CARE

## 2022-07-30 PROCEDURE — 0651 HSPC ROUTINE HOME CARE

## 2022-07-31 ENCOUNTER — HOME CARE VISIT (OUTPATIENT)
Dept: HOSPICE | Facility: HOSPICE | Age: 87
End: 2022-07-31
Payer: MEDICARE

## 2022-07-31 PROCEDURE — 0651 HSPC ROUTINE HOME CARE

## 2022-08-01 ENCOUNTER — HOME CARE VISIT (OUTPATIENT)
Dept: SCHEDULING | Facility: HOME HEALTH | Age: 87
End: 2022-08-01
Payer: MEDICARE

## 2022-08-01 VITALS
SYSTOLIC BLOOD PRESSURE: 115 MMHG | TEMPERATURE: 97.8 F | RESPIRATION RATE: 13 BRPM | DIASTOLIC BLOOD PRESSURE: 55 MMHG | HEART RATE: 78 BPM

## 2022-08-01 PROCEDURE — G0156 HHCP-SVS OF AIDE,EA 15 MIN: HCPCS

## 2022-08-01 PROCEDURE — G0299 HHS/HOSPICE OF RN EA 15 MIN: HCPCS

## 2022-08-01 PROCEDURE — 0651 HSPC ROUTINE HOME CARE

## 2022-08-01 NOTE — HOSPICE
Arrived at facility; patient resting, but opened eyes to RN's voice. Patient had no nonverbal signs of pain, breathing nonlabored and even. Facility RN said she had no concerns/issues with patient, appetite varies and BMs consistent. Voicemail left for son to update after visit.

## 2022-08-02 PROCEDURE — 0651 HSPC ROUTINE HOME CARE

## 2022-08-03 PROCEDURE — 0651 HSPC ROUTINE HOME CARE

## 2022-08-04 PROCEDURE — 0651 HSPC ROUTINE HOME CARE

## 2022-08-05 PROCEDURE — 0651 HSPC ROUTINE HOME CARE

## 2022-08-06 PROCEDURE — 0651 HSPC ROUTINE HOME CARE

## 2022-08-07 PROCEDURE — 0651 HSPC ROUTINE HOME CARE

## 2022-08-08 ENCOUNTER — HOME CARE VISIT (OUTPATIENT)
Dept: HOSPICE | Facility: HOSPICE | Age: 87
End: 2022-08-08
Payer: MEDICARE

## 2022-08-08 ENCOUNTER — HOME CARE VISIT (OUTPATIENT)
Dept: SCHEDULING | Facility: HOME HEALTH | Age: 87
End: 2022-08-08
Payer: MEDICARE

## 2022-08-08 PROCEDURE — G0299 HHS/HOSPICE OF RN EA 15 MIN: HCPCS

## 2022-08-08 PROCEDURE — 0651 HSPC ROUTINE HOME CARE

## 2022-08-08 PROCEDURE — G0156 HHCP-SVS OF AIDE,EA 15 MIN: HCPCS

## 2022-08-08 NOTE — HOSPICE
Dominic   Arrived to patients room and she was in bed watching Jacqulyne Betancur Girls. Drowsy during assessment but oriented to name and roused easily with verbal stimuli and light touch. Patients complaints were none. Respirations were unlabored. Lung were clear with diminished sounds in the bases. Last Bowel Movement [id number]. No edema noted, pulses palpable. No nonverbal signs of pain, shortness of breath or agitation. Skin intact and well moisturized. Medication reconciled with caregiver. Supplies ordered last week. Oxygen via nasal canula at 3 liters intatct. Max assist with activities of daily living and max assist with transfers and mobility. Told to contact hospice with questions, concerns or change in status. Son was called and updated.

## 2022-08-08 NOTE — HOSPICE
Music therapy Visit  Date: 8/8/22    Mental Status:   [x] Alert        [  ] Forgetful     [x]  Confused   [  ] Minimally responsive  [  ] Sleeping     Communication Status: [  ] Impaired Speech         [  ] Nonverbal -N/A     Physical Status:   [x] Oxygen in use             [  ] Hard of Hearing     [  ] Vision Impaired  [  ] Ambulatory             [  ] Ambulatory with assistance           [  ] Non-ambulatory      Music Preferences, Background: Traditional hymns      Clinical Problem to be addressed: Spiritual support, Engagement      Goal(s) met in session:   Physical/Pain management (Scale of 1-10):    Pre-session rating:   Post-session rating:   [  ] Increased relaxation                                  [  ] Affected breathing patterns  [  ] Decreased muscle tension                        [  ] Decreased agitation  [  ] Affected heart rate                                     [x] Increased alertness                Emotional/Psychological:  [  ] Increased self-expression                          [  ] Decreased aggressive behavior  [  ] Decreased feelings of stress                     [  ] Discussed healthy coping skills     [  ] Improved mood                                          [  ] Decreased withdrawn behavior     Social:  [  ] Decreased feelings of isolation/loneliness            [x] Positive social interaction  [  ] Provided support and/or comfort for family/friends     Spiritual:  [x] Spiritual support                                         [  ] Expressed peace  [  ] Expressed harsh                                          [  ] Discussed beliefs     Techniques Utilized (Check all that apply):   [  ] Procedural support MT      [  ] Music for relaxation            [x] Patient preferred music  [ Judie Sleight analysis                     [  ] Rozina Ceja choice                        [x] Music for validation  [  ] Entrainment                       [  ] Movement to music             [  ] Guided visualization  [ Irene Baltazar Principle                      [  ] Patient instrument playing  [ Meserettran Parrishws writing  [ Eliane Chowdhuryler [ Brice Sermarshallant [  ] Sensory stimulation  [ Aubrie Blocker [x] Music for spiritual support  [  ] Making of CDs as gifts     Session Observations: Patient (pt) was alert, sitting in the dining room when this music therapist (MT) entered the unit. MT greeted her by gently rubbing her back while sitting eye-level with her. MT then received approval from staff to transfer the pt to her room for a 30-minute music therapy session before lunch started. As they re-entered the room alongside an RN assisting with this, MT sat across from the pt and sang and played a variety of hymns with guitar at an upbeat tempo. MT also incorporated conversation within the music for further engagement (noting the weather, various things/trinkets in her room, etc.). Pt responded by switching her focus between this MT and the guitar. Pt also smiled and laughed throughout the visit. As the 30 minutes concluded, MT exited to allow an RN to change her oxygen tank and provide her lunch.      CRUZITO Tavera (Music Therapist, Board Certified)  [address] [facility]

## 2022-08-09 ENCOUNTER — HOME CARE VISIT (OUTPATIENT)
Dept: SCHEDULING | Facility: HOME HEALTH | Age: 87
End: 2022-08-09
Payer: MEDICARE

## 2022-08-09 PROCEDURE — 0651 HSPC ROUTINE HOME CARE

## 2022-08-09 PROCEDURE — G0155 HHCP-SVS OF CSW,EA 15 MIN: HCPCS

## 2022-08-09 NOTE — HOSPICE
Routine follow up visit to provide comfort and spiritual guidance to the patient, family and caregiver as they make their journey towards end of life. Upon arrival, patient was resting comfortably. Offered empathic presence and silent prayer according to patient's harsh.

## 2022-08-10 PROCEDURE — 0651 HSPC ROUTINE HOME CARE

## 2022-08-10 NOTE — HOSPICE
LMSW met with patient in her room. LMSW provided music-based socialization to patient with familiar hymns. Patient was alert and engaged with eye contract, facial expressions, and attempts of verbalize. LMSW checked in with facility staff who reported no new needs or concerns. LMSW will continue to offer support and remain available to address needs that arise.

## 2022-08-11 PROCEDURE — 0651 HSPC ROUTINE HOME CARE

## 2022-08-12 PROCEDURE — 0651 HSPC ROUTINE HOME CARE

## 2022-08-13 PROCEDURE — 0651 HSPC ROUTINE HOME CARE

## 2022-08-14 PROCEDURE — 0651 HSPC ROUTINE HOME CARE

## 2022-08-15 ENCOUNTER — HOME CARE VISIT (OUTPATIENT)
Dept: SCHEDULING | Facility: HOME HEALTH | Age: 87
End: 2022-08-15
Payer: MEDICARE

## 2022-08-15 VITALS
SYSTOLIC BLOOD PRESSURE: 140 MMHG | DIASTOLIC BLOOD PRESSURE: 84 MMHG | RESPIRATION RATE: 22 BRPM | HEART RATE: 84 BPM | TEMPERATURE: 98.4 F

## 2022-08-15 PROCEDURE — 0651 HSPC ROUTINE HOME CARE

## 2022-08-15 PROCEDURE — G0299 HHS/HOSPICE OF RN EA 15 MIN: HCPCS

## 2022-08-15 NOTE — HOSPICE
Arrived to patients room and she was up in bed watching tv. . Drowsy during assessment. Fingernail care performed. Patient rouses easily with verbal stimuli and light touch. Appetite waxes and wanes. Patients complaints were none. Respirations were unlabored. Lung were clear with diminished sounds in the bases. Last Bowel Movement [id number]. No edema noted, pulses palpable. No nonverbal signs of pain, shortness of breath or agitation. Skin intact and well moisturized. Medication reconciled with caregiver. Supplies ordered. Oxygen via nasal canula at 3 liters intatct. Ordered humidification set up as patient had a nose bleed. Max assist with activities of daily living and max assist with transfers and mobility. Told to contact hospice with questions, concerns or change in status. Son was called and updated.

## 2022-08-16 PROCEDURE — 0651 HSPC ROUTINE HOME CARE

## 2022-08-17 ENCOUNTER — HOME CARE VISIT (OUTPATIENT)
Dept: SCHEDULING | Facility: HOME HEALTH | Age: 87
End: 2022-08-17
Payer: MEDICARE

## 2022-08-17 PROCEDURE — 0651 HSPC ROUTINE HOME CARE

## 2022-08-17 PROCEDURE — G0156 HHCP-SVS OF AIDE,EA 15 MIN: HCPCS

## 2022-08-18 PROCEDURE — 0651 HSPC ROUTINE HOME CARE

## 2022-08-19 PROCEDURE — 0651 HSPC ROUTINE HOME CARE

## 2022-08-20 PROCEDURE — 0651 HSPC ROUTINE HOME CARE

## 2022-08-21 PROCEDURE — 0651 HSPC ROUTINE HOME CARE

## 2022-08-22 ENCOUNTER — HOME CARE VISIT (OUTPATIENT)
Dept: SCHEDULING | Facility: HOME HEALTH | Age: 87
End: 2022-08-22
Payer: MEDICARE

## 2022-08-22 PROCEDURE — G0156 HHCP-SVS OF AIDE,EA 15 MIN: HCPCS

## 2022-08-22 PROCEDURE — 0651 HSPC ROUTINE HOME CARE

## 2022-08-23 PROCEDURE — 0651 HSPC ROUTINE HOME CARE

## 2022-08-24 ENCOUNTER — HOME CARE VISIT (OUTPATIENT)
Dept: SCHEDULING | Facility: HOME HEALTH | Age: 87
End: 2022-08-24
Payer: MEDICARE

## 2022-08-24 VITALS
SYSTOLIC BLOOD PRESSURE: 124 MMHG | DIASTOLIC BLOOD PRESSURE: 68 MMHG | OXYGEN SATURATION: 98 % | RESPIRATION RATE: 16 BRPM | HEART RATE: 60 BPM | TEMPERATURE: 97.9 F

## 2022-08-24 PROCEDURE — 0651 HSPC ROUTINE HOME CARE

## 2022-08-24 PROCEDURE — G0299 HHS/HOSPICE OF RN EA 15 MIN: HCPCS

## 2022-08-24 NOTE — HOSPICE
Patient sitting up in bed upon arrival for visit. Patient resting with eyes closed but easily awoken to voice. No objective signs of pain, anxiety or dyspnea observed. Patient on 3L O2 via NC continuously. Facility staff reports patient's appetite remains stable. Facility staff requesting supplies to be ordered; multiple supply items ordered for delivery via [employer]. Phone call placed to patient's son Lay Donald for visit update. Reinforced hospice 24/7 for any concerns or change in patient's condition.

## 2022-08-25 ENCOUNTER — HOME CARE VISIT (OUTPATIENT)
Dept: HOSPICE | Facility: HOSPICE | Age: 87
End: 2022-08-25
Payer: MEDICARE

## 2022-08-25 PROCEDURE — 0651 HSPC ROUTINE HOME CARE

## 2022-08-25 NOTE — HOSPICE
Mental Status:   [x] Alert -at times       [  ] Forgetful     [  ]  Confused   [  ] Minimally responsive  [x] Sleeping -at times     Communication Status: [  ] Impaired Speech         [  ] Nonverbal -N/A     Physical Status:   [x] Oxygen in use             [  ] Hard of Hearing     [  ] Vision Impaired  [  ] Ambulatory             [  ] Ambulatory with assistance           [  ] Non-ambulatory      Music Preferences, Background: Traditional hymns     Clinical Problem to be addressed: spiritual support, engagement      Goal(s) met in session:   Physical/Pain management (Scale of 1-10):    Pre-session rating: N/A  Post-session rating: N/A  [  ] Increased relaxation                                  [  ] Affected breathing patterns  [  ] Decreased muscle tension                        [  ] Decreased agitation  [  ] Affected heart rate                                     [x] Increased alertness                Emotional/Psychological:  [  ] Increased self-expression                          [  ] Decreased aggressive behavior  [  ] Decreased feelings of stress                     [  ] Discussed healthy coping skills     [  ] Improved mood                                          [x] Decreased withdrawn behavior     Social:  [  ] Decreased feelings of isolation/loneliness            [x] Positive social interaction  [  ] Provided support and/or comfort for family/friends     Spiritual:  [x] Spiritual support                                         [  ] Expressed peace  [  ] Expressed harsh                                          [  ] Discussed beliefs     Techniques Utilized (Check all that apply):   [  ] Procedural support MT      [  ] Music for relaxation            [x] Patient preferred music  [ Nabila Melendez analysis                     [  ] Joyce Duron choice                        [x] Music for validation  [  ] Entrainment                       [x] Movement to music             [  ] Guided visualization  [ Pily Lerner Principle                      [  ] Patient instrument playing  [ Kelly Plata writing  [ Lizbeth Landonomer [x] Improvisation                      [  ] Sensory stimulation  [ Ellenorhdavid Jordan [x] Music for spiritual support  [  ] Making of CDs as gifts     Session Observations: Patient (pt) was sitting in her wheelchair, asleep when this music therapist (MT) peered into the room. MT greeted her by speaking her name and gently rubbing her arm. She awoke and smiled in response to this. MT engaged in conversation with her, explaining who the MT and what the structure of the session would look like. Though pt did not verbally respond, MT observed her maintain eye-contact while nodding her head up and down, as if to confirm she understood. MT began to sing and play a variety of familiar tunes and traditional hymns with guitar at an upbeat tempo. Pt continued to maintain focus on this MT and the guitar, while also laughing throughout the visit. MT checked in with her at times, as well as incorporated an improvised greeting song that included her name and other various things about/around her. Pt appeared to briefly increase self-expression in response to this AEB (As Evidenced By) nodding her head along with the music. As MT continued to provide music, pt appeared to close her eyes and fall asleep. MT attempted to awaken her by increasing the tempo of the music and singing the greeting song again. Pt didn't respond to this.  MT incorporated a few more songs and concluded the session to allow space for the pt to rest.     Adam Quesada MT-BC (Music Therapist, Board Certified)  [address] [facility]

## 2022-08-26 PROCEDURE — 0651 HSPC ROUTINE HOME CARE

## 2022-08-27 PROCEDURE — 0651 HSPC ROUTINE HOME CARE

## 2022-08-28 PROCEDURE — 0651 HSPC ROUTINE HOME CARE

## 2022-08-29 ENCOUNTER — HOME CARE VISIT (OUTPATIENT)
Dept: SCHEDULING | Facility: HOME HEALTH | Age: 87
End: 2022-08-29
Payer: MEDICARE

## 2022-08-29 VITALS
HEART RATE: 71 BPM | TEMPERATURE: 98 F | SYSTOLIC BLOOD PRESSURE: 115 MMHG | DIASTOLIC BLOOD PRESSURE: 72 MMHG | RESPIRATION RATE: 18 BRPM

## 2022-08-29 PROCEDURE — G0156 HHCP-SVS OF AIDE,EA 15 MIN: HCPCS

## 2022-08-29 PROCEDURE — G0299 HHS/HOSPICE OF RN EA 15 MIN: HCPCS

## 2022-08-29 PROCEDURE — 0651 HSPC ROUTINE HOME CARE

## 2022-08-30 PROCEDURE — 0651 HSPC ROUTINE HOME CARE

## 2022-08-30 NOTE — HOSPICE
Arrived at patients room and she was watching tv. Appetite is fair. Max assist with all ADL's  Hands are contracted. Washcloths rolls in place. Complaints were none. Respirations were unlabored. Lungs clear and diminished in bases. Asked nursing staff to fill humidification for o2 concentrator. Bowel sounds are active. No edema and pulses palpable. No nonverbal signs of pain, shortness of breath or agitation. Skin well moisturized. Patient continues to have low energy and expressive aphasia although she can responds to name. Medication reconciled with caregiver. Education related to medication administration performed and reinforced. Told to contact hospice with questions, concerns or change in status. Son updated.

## 2022-08-31 PROCEDURE — 0651 HSPC ROUTINE HOME CARE

## 2022-09-01 ENCOUNTER — HOME CARE VISIT (OUTPATIENT)
Dept: SCHEDULING | Facility: HOME HEALTH | Age: 87
End: 2022-09-01
Payer: MEDICARE

## 2022-09-01 PROCEDURE — G0155 HHCP-SVS OF CSW,EA 15 MIN: HCPCS

## 2022-09-01 PROCEDURE — 0651 HSPC ROUTINE HOME CARE

## 2022-09-02 PROCEDURE — 0651 HSPC ROUTINE HOME CARE

## 2022-09-02 NOTE — HOSPICE
LMSW attended care plan meeting for patient along with Yesenia Russell, Activities, MDS Coordinator, and daughter-in-law [place] Friday. LMSW shared report provided by JEFF Ludwig regarding patient's status. LMSW reported on [place] [facility] [address] [place] [address], and [place]. Daughter-in-law inquired about patient's O2 need. LMSW stated that LMSW will follow up with RN regarding necessity or not of O2. Patient's overall plan of care discussed. LMSW and daughter-in-law Flores Friday went to visit patient afterward but patient is now on droplet precautions. Upon checking with facility staff, patient is not COVID+ or infectious, but is on precautions for 10 days due to booster status. [place] Friday stated that she would visit patient at another time but asked LMSW to inform patient that she was here. LMSW met with patient for socialization and shared that Flores Friday had tried to visit. LMSW commented on the television show that gavin was watching. Patient engaged in socialization. Patient attempted to verbalize and was able to say \"okay. \" Patient engaged with eye contact and facial expressions. LMSW followed up with hospice team about increased socialization need/isolation risk r/t precautions being implemented and patient's dementia. Increased visit support will be provided. LMSW will continue to offer support and will remain available to address needs that arise.

## 2022-09-03 PROCEDURE — 0651 HSPC ROUTINE HOME CARE

## 2022-09-04 PROCEDURE — 0651 HSPC ROUTINE HOME CARE

## 2022-09-05 ENCOUNTER — HOME CARE VISIT (OUTPATIENT)
Dept: SCHEDULING | Facility: HOME HEALTH | Age: 87
End: 2022-09-05
Payer: MEDICARE

## 2022-09-05 PROCEDURE — 0651 HSPC ROUTINE HOME CARE

## 2022-09-06 PROCEDURE — 0651 HSPC ROUTINE HOME CARE

## 2022-09-07 ENCOUNTER — HOME CARE VISIT (OUTPATIENT)
Dept: SCHEDULING | Facility: HOME HEALTH | Age: 87
End: 2022-09-07
Payer: MEDICARE

## 2022-09-07 PROCEDURE — 0651 HSPC ROUTINE HOME CARE

## 2022-09-07 PROCEDURE — G0299 HHS/HOSPICE OF RN EA 15 MIN: HCPCS

## 2022-09-08 VITALS — TEMPERATURE: 98.6 F | RESPIRATION RATE: 16 BRPM | HEART RATE: 67 BPM | OXYGEN SATURATION: 96 %

## 2022-09-08 PROCEDURE — 0651 HSPC ROUTINE HOME CARE

## 2022-09-08 NOTE — HOSPICE
Routine Sn  hospice assessment visit  Patient is in isolation precautions  RN followed  precautions per protocol   Patient received in hospital bed awake and alert  makes eye contact  Flat affect  Non verbal although attempts to speak periodically   Color  marked pallor   No S/S of acute pain or shortness of breath noted during visit    On continuous nasal 02 3L  Respirations 16 and unlabored  Breath sounds clear and diminished at bases   Patient has dry non productive cough frequently  No new issues reported by staff this visit   To continue current hospice plan of care   Informed staff to call hospice with concerns / needs  Understanding verbalized

## 2022-09-09 PROCEDURE — 0651 HSPC ROUTINE HOME CARE

## 2022-09-10 PROCEDURE — 0651 HSPC ROUTINE HOME CARE

## 2022-09-11 PROCEDURE — 0651 HSPC ROUTINE HOME CARE

## 2022-09-12 ENCOUNTER — HOME CARE VISIT (OUTPATIENT)
Dept: SCHEDULING | Facility: HOME HEALTH | Age: 87
End: 2022-09-12
Payer: MEDICARE

## 2022-09-12 PROCEDURE — G0156 HHCP-SVS OF AIDE,EA 15 MIN: HCPCS

## 2022-09-12 PROCEDURE — 0651 HSPC ROUTINE HOME CARE

## 2022-09-13 ENCOUNTER — HOME CARE VISIT (OUTPATIENT)
Dept: SCHEDULING | Facility: HOME HEALTH | Age: 87
End: 2022-09-13
Payer: MEDICARE

## 2022-09-13 VITALS
DIASTOLIC BLOOD PRESSURE: 73 MMHG | RESPIRATION RATE: 20 BRPM | SYSTOLIC BLOOD PRESSURE: 136 MMHG | TEMPERATURE: 98.1 F | HEART RATE: 67 BPM

## 2022-09-13 PROCEDURE — G0299 HHS/HOSPICE OF RN EA 15 MIN: HCPCS

## 2022-09-13 PROCEDURE — 0651 HSPC ROUTINE HOME CARE

## 2022-09-13 NOTE — HOSPICE
Arrived at patients room and she was watching tv. Appetite is good. Max assist with all ADL's  Hands are contracted. Wahcloth rolls in place. Educated nursing aide and nurse on hand care and the risk of breakdown with the contractures as well as risk of infection. Mac decreased by .50. Complaints were none. Respirations were unlabored. Lungs clear and diminished in bases. Asked nursing staff to fill humidification for o2 concentrator. Bowel sounds are active. No edema and pulses palpable. No nonverbal signs of pain, shortness of breath or agitation. Skin well moisturized. Fingernail care done. Patient continues to have low energy and expressive aphasia although she can responds to name. Medication reconciled with caregiver. Education related to medication administration performed and reinforced. Told to contact hospice with questions, concerns or change in status. Son updated.

## 2022-09-14 ENCOUNTER — HOME CARE VISIT (OUTPATIENT)
Dept: HOSPICE | Facility: HOSPICE | Age: 87
End: 2022-09-14
Payer: MEDICARE

## 2022-09-14 PROCEDURE — 0651 HSPC ROUTINE HOME CARE

## 2022-09-14 NOTE — HOSPICE
Mental Status:   [x] Alert        [  ] Forgetful     [x]  Confused   [  ] Minimally responsive  [x] Sleeping     Communication Status: [  ] Impaired Speech         [  ] Nonverbal -N/A     Physical Status:   [x] Oxygen in use             [  ] Hard of Hearing     [  ] Vision Impaired  [  ] Ambulatory             [  ] Ambulatory with assistance           [  ] Non-ambulatory      Music Preferences, Background: Pt enjoys traditional hymns      Clinical Problem to be addressed: Engagement and spiritual support     Goal(s) met in session:   Physical/Pain management (Scale of 1-10):    Pre-session rating: N/A  Post-session rating: N/A  [  ] Increased relaxation                                  [  ] Affected breathing patterns  [  ] Decreased muscle tension                        [  ] Decreased agitation  [  ] Affected heart rate                                     [x] Increased alertness                Emotional/Psychological:  [x] Increased self-expression                          [  ] Decreased aggressive behavior  [  ] Decreased feelings of stress                     [  ] Discussed healthy coping skills     [  ] Improved mood                                          [  ] Decreased withdrawn behavior     Social:  [  ] Decreased feelings of isolation/loneliness            [x] Positive social interaction  [  ] Provided support and/or comfort for family/friends     Spiritual:  [x] Spiritual support                                         [  ] Expressed peace  [  ] Expressed harsh                                          [  ] Discussed beliefs     Techniques Utilized (Check all that apply):   [  ] Procedural support MT      [  ] Music for relaxation            [x] Patient preferred music  [ Celine Rendon analysis                     [  ] Darrick Given choice                        [x] Music for validation  [  ] Entrainment                       [  ] Movement to music             [x] Guided visualization  [  ] ISO Principle                      [  ] Patient instrument playing  [ Ibrahima Kaur writing  [ Liset Conine [ Casi Promise [x] Sensory stimulation  [ Kamilaflashveronica Raman [x] Music for spiritual support  [  ] Making of CDs as gifts     Session Observations: Patient (pt) was lying in bed, asleep when this music therapist (MT) entered the room. MT gently awoke her by rubbing her arms and speaking her name. Pt appeared to arouse in response to touch AEB mumbling and smiling, though her eyes remained closed. MT verbally acknowledged this and then asked if she was able to open her eyes. Pt did this successfully. MT provided words of encouragement in response to this. MT shared role and engaged in conversation with the pt over various topics (the weather, the items in her room, etc.). MT then sang Scar Irizarry Me, You Are My Susan Mems, and Oh What a Beautiful Morning acapella while providing moments of sensory stimulation (tapping her arms along with the tempo of the music). Throughout the session pt maintained eye-contact while smiling and mumbling at this MT, though MT was unable to understand what she was saying. MT then incorporated guitar and sang and played a variety of popular tunes and hymns at an upbeat tempo. MT included the pt's name within the lyrics and provided a brief guided visualization along with the hymn In the [address] [place] [address]. Pt continued to maintain focus on this MT and respond positively to the music. MT then observed a staff member enter the room with the pt's lunch. MT sang and played 3 additional hymns with guitar at an upbeat tempo and concluded the session to allow space for the pt to eat lunch. MT thanked the pt and exited.      CRUZITO Holliday (Music Therapist, Board Certified)  [address]

## 2022-09-15 PROCEDURE — 0651 HSPC ROUTINE HOME CARE

## 2022-09-16 PROCEDURE — 0651 HSPC ROUTINE HOME CARE

## 2022-09-17 PROCEDURE — 0651 HSPC ROUTINE HOME CARE

## 2022-09-18 PROCEDURE — 0651 HSPC ROUTINE HOME CARE

## 2022-09-19 ENCOUNTER — HOME CARE VISIT (OUTPATIENT)
Dept: SCHEDULING | Facility: HOME HEALTH | Age: 87
End: 2022-09-19
Payer: MEDICARE

## 2022-09-19 PROCEDURE — 0651 HSPC ROUTINE HOME CARE

## 2022-09-19 PROCEDURE — G0156 HHCP-SVS OF AIDE,EA 15 MIN: HCPCS

## 2022-09-20 ENCOUNTER — HOME CARE VISIT (OUTPATIENT)
Dept: HOSPICE | Facility: HOSPICE | Age: 87
End: 2022-09-20
Payer: MEDICARE

## 2022-09-20 VITALS
DIASTOLIC BLOOD PRESSURE: 80 MMHG | TEMPERATURE: 97.9 F | HEART RATE: 85 BPM | SYSTOLIC BLOOD PRESSURE: 138 MMHG | RESPIRATION RATE: 14 BRPM

## 2022-09-20 PROCEDURE — G0299 HHS/HOSPICE OF RN EA 15 MIN: HCPCS

## 2022-09-20 PROCEDURE — 0651 HSPC ROUTINE HOME CARE

## 2022-09-20 NOTE — HOSPICE
Arrived at facility; patient laying bed.  skin assessment completed with facility RNs. No areas of breakdown noted. Patient alert to self. Neutral facial expression, breathing relaxed, and nonlabored. Incontinence care provided, patient with small BM. Son called with update. Wipes, cream, chux ordered.

## 2022-09-21 PROCEDURE — 0651 HSPC ROUTINE HOME CARE

## 2022-09-22 PROCEDURE — 0651 HSPC ROUTINE HOME CARE

## 2022-09-23 ENCOUNTER — HOME CARE VISIT (OUTPATIENT)
Dept: HOSPICE | Facility: HOSPICE | Age: 87
End: 2022-09-23
Payer: MEDICARE

## 2022-09-23 PROCEDURE — 0651 HSPC ROUTINE HOME CARE

## 2022-09-24 PROCEDURE — 0651 HSPC ROUTINE HOME CARE

## 2022-09-25 PROCEDURE — 0651 HSPC ROUTINE HOME CARE

## 2022-09-25 NOTE — HOSPICE
No spiritual care needs expressed by patient or CG, nor have they reached out for support. Will contact patient/family to assess needs and offer a visit.

## 2022-09-25 NOTE — HOSPICE
Routine follow up visit to provide comfort and spiritual guidance to the patient, family and caregiver as they make their journey towards end of life. Patient seated in wheelchair and appeared alert and smiling. Facilitated music and prayer according to patient's Leanne Victor M. Offered empathic presence and socialization. Overall, patient appeared more relaxed and calm as visit progressed.

## 2022-09-26 ENCOUNTER — HOME CARE VISIT (OUTPATIENT)
Dept: SCHEDULING | Facility: HOME HEALTH | Age: 87
End: 2022-09-26
Payer: MEDICARE

## 2022-09-26 VITALS
HEART RATE: 68 BPM | RESPIRATION RATE: 18 BRPM | SYSTOLIC BLOOD PRESSURE: 132 MMHG | DIASTOLIC BLOOD PRESSURE: 84 MMHG | TEMPERATURE: 98.2 F

## 2022-09-26 PROCEDURE — 0651 HSPC ROUTINE HOME CARE

## 2022-09-26 PROCEDURE — G0156 HHCP-SVS OF AIDE,EA 15 MIN: HCPCS

## 2022-09-26 PROCEDURE — G0299 HHS/HOSPICE OF RN EA 15 MIN: HCPCS

## 2022-09-26 NOTE — HOSPICE
Arrived at patients room and she was napping. Appetite is good this week. Max assist with all ADL's  Hands are contracted. Washcloth rolls in place. Fingernail care performed. Complaints were none. Respirations were unlabored. Lungs clear and diminished in bases. Bowel sounds are active. No edema and pulses palpable. No nonverbal signs of pain, shortness of breath or agitation. Skin well moisturized. Patient continues to have low energy and expressive aphasia although she can responds to name. Medication reconciled with caregiver. Education related to medication administration performed and reinforced. Told to contact hospice with questions, concerns or change in status. Son updated.

## 2022-09-27 PROCEDURE — 0651 HSPC ROUTINE HOME CARE

## 2022-09-28 PROCEDURE — 0651 HSPC ROUTINE HOME CARE

## 2022-09-29 PROCEDURE — 0651 HSPC ROUTINE HOME CARE

## 2022-09-30 PROCEDURE — 0651 HSPC ROUTINE HOME CARE

## 2022-10-01 PROCEDURE — 0651 HSPC ROUTINE HOME CARE

## 2022-10-02 PROCEDURE — 0651 HSPC ROUTINE HOME CARE

## 2022-10-03 ENCOUNTER — HOME CARE VISIT (OUTPATIENT)
Dept: SCHEDULING | Facility: HOME HEALTH | Age: 87
End: 2022-10-03
Payer: MEDICARE

## 2022-10-03 PROCEDURE — G0299 HHS/HOSPICE OF RN EA 15 MIN: HCPCS

## 2022-10-03 PROCEDURE — G0155 HHCP-SVS OF CSW,EA 15 MIN: HCPCS

## 2022-10-03 PROCEDURE — 0651 HSPC ROUTINE HOME CARE

## 2022-10-03 PROCEDURE — HOSPICE MEDICATION HC HH HOSPICE MEDICATION

## 2022-10-03 PROCEDURE — G0156 HHCP-SVS OF AIDE,EA 15 MIN: HCPCS

## 2022-10-04 VITALS
DIASTOLIC BLOOD PRESSURE: 80 MMHG | SYSTOLIC BLOOD PRESSURE: 140 MMHG | TEMPERATURE: 97.6 F | HEART RATE: 68 BPM | RESPIRATION RATE: 18 BRPM

## 2022-10-04 PROCEDURE — 0651 HSPC ROUTINE HOME CARE

## 2022-10-04 NOTE — HOSPICE
Arrived at patients room and she was watching tv. Appetite is fair Max assist with all ADL's  Hands are contracted. Wahcloth rolls in place. Complaints were none. Respirations were unlabored. Lungs clear and diminished in bases. 02 intact via nasal canula. BM today. Bowel sounds are active. No edema and pulses palpable. No nonverbal signs of pain, shortness of breath or agitation. Skin well moisturized. Safia Whipples asleep in chair during visit. Medication reconciled with caregiver. Education related to medication administration performed and reinforced. Told to contact hospice with questions, concerns or change in status. Son updated.

## 2022-10-05 PROCEDURE — 0651 HSPC ROUTINE HOME CARE

## 2022-10-06 PROCEDURE — 0651 HSPC ROUTINE HOME CARE

## 2022-10-07 PROCEDURE — 0651 HSPC ROUTINE HOME CARE

## 2022-10-07 NOTE — HOSPICE
LMSW met with patient in her room and provided socialization. Patient engaged with facial expressions, eye contact, and 1-2 word phrases at times. LMSW read patient the fall reflection from the facility  which was on her bedside table. LMSW checked in with facility staff who reported no new needs or concerns. JEFF Ludwig arrived for visit as LMSW was leaving. LMSW will continue to offer support and will remain available to address needs that arise.

## 2022-10-08 PROCEDURE — 0651 HSPC ROUTINE HOME CARE

## 2022-10-09 PROCEDURE — 0651 HSPC ROUTINE HOME CARE

## 2022-10-10 ENCOUNTER — HOME CARE VISIT (OUTPATIENT)
Dept: SCHEDULING | Facility: HOME HEALTH | Age: 87
End: 2022-10-10
Payer: MEDICARE

## 2022-10-10 PROCEDURE — 0651 HSPC ROUTINE HOME CARE

## 2022-10-10 PROCEDURE — G0156 HHCP-SVS OF AIDE,EA 15 MIN: HCPCS

## 2022-10-11 ENCOUNTER — HOME CARE VISIT (OUTPATIENT)
Dept: HOSPICE | Facility: HOSPICE | Age: 87
End: 2022-10-11
Payer: MEDICARE

## 2022-10-11 PROCEDURE — 0651 HSPC ROUTINE HOME CARE

## 2022-10-12 PROCEDURE — 0651 HSPC ROUTINE HOME CARE

## 2022-10-12 NOTE — HOSPICE
Routine follow up visit to provide comfort and spiritual guidance to the patient, family and caregiver as they make their journey towards end of life. Upon arrival, patient engaged some verbally and was smiling. Offered empathic presence. Facilitated music, scripture and prayer. At the conclusion of visit, patient was able to verbally express appreciation for  support.

## 2022-10-13 ENCOUNTER — HOME CARE VISIT (OUTPATIENT)
Dept: SCHEDULING | Facility: HOME HEALTH | Age: 87
End: 2022-10-13
Payer: MEDICARE

## 2022-10-13 VITALS
RESPIRATION RATE: 18 BRPM | SYSTOLIC BLOOD PRESSURE: 124 MMHG | HEART RATE: 76 BPM | DIASTOLIC BLOOD PRESSURE: 80 MMHG | TEMPERATURE: 98 F

## 2022-10-13 PROCEDURE — G0299 HHS/HOSPICE OF RN EA 15 MIN: HCPCS

## 2022-10-13 PROCEDURE — HOSPICE MEDICATION HC HH HOSPICE MEDICATION

## 2022-10-13 PROCEDURE — 0651 HSPC ROUTINE HOME CARE

## 2022-10-14 ENCOUNTER — HOME CARE VISIT (OUTPATIENT)
Dept: HOSPICE | Facility: HOSPICE | Age: 87
End: 2022-10-14
Payer: MEDICARE

## 2022-10-14 PROCEDURE — 0651 HSPC ROUTINE HOME CARE

## 2022-10-14 NOTE — HOSPICE
Music Therapy Visit    Date: 10/13/22    Mental Status:   [x] Alert  [  ] Ahmad Founds [  ]  Confused  [  ] Minimally responsive  [x] Sleeping -at times    Communication Status: [x] Impaired Speech [  ] Nonverbal     Physical Status:   [x] Oxygen in use  [  ] Hard of Hearing [  ] Vision Impaired   [  ] Ambulatory  [  ] Ambulatory with assistance [  ] Non-ambulatory     Music Preferences, Background: Traditional hymns    Clinical Problem addressed: Spiritual support, engagement    Goal(s) met in session:  Physical/Pain management (Scale of 1-10):    Pre-session rating: N/A  Post-session rating: N/A  [x] Increased relaxation   [  ] Affected breathing patterns  [  ] Decreased muscle tension   [  ] Decreased agitation  [  ] Affected heart rate   [x] Increased alertness -briefly    Emotional/Psychological:  [x] Increased self-expression  [  ] Decreased aggressive behavior   [  ] Decreased feelings of stress  [  ] Discussed healthy coping skills     [  ] Improved mood   [  ] Decreased withdrawn behavior     Social:  [  ] Decreased feelings of isolation/loneliness [x] Positive social interaction    [  ] Provided support and/or comfort for family/friends    Spiritual:  [x] Spiritual support    [  ] Expressed peace  [  ] Expressed harsh    [  ] Discussed beliefs    Techniques Utilized (Check all that apply):   [  ] Procedural support MT [x] Music for relaxation [x] Patient preferred music  [  ] Benita analysis  [  ] Song choice  [  ] Music for validation  [  ] Entrainment  [  ] Movement to music [  ] Guided visualization  [  ] Shari Larson  [  ] Patient instrument playing [  ] Russ Frazier writing  [  ] Mare Reasoner along   [  ] Improvisation  [x] Sensory stimulation  [  ] Active Listening  [x] Music for spiritual support [  ] Making of CDs as gifts    Session Observations: Patient (pt) was lying in bed with her eyes open when this music therapist (MT) entered the room.  MT stood at bedside and greeted the pt by gently rubbing her arm and speaking her name. Pt laughed and smiled in response to this. MT explained what to expect during the session as they set up music therapy gear at bedside. Pt's eyes opened and closed throughout this experience. MT began the session by singing familiar tunes acapella while gently patting/rubbing her arm. MT adjusted the tapping along with the tempo of the music for further entrained comfort. Pt appeared to smile at times throughout the experience but did not respond other than this. MT then incorporated the guitar and began to sing and play a variety of hymns at various tempos. After observing the pt close her eyes for a longer period, this MT concluded the session by singing The Lords Prayer acapella.  MT exited to respect the pt's space and rest.    EAGLE GordonBC (Music Therapist, Board Certified)  [address]

## 2022-10-15 PROCEDURE — 0651 HSPC ROUTINE HOME CARE

## 2022-10-16 PROCEDURE — 0651 HSPC ROUTINE HOME CARE

## 2022-10-17 ENCOUNTER — HOME CARE VISIT (OUTPATIENT)
Dept: SCHEDULING | Facility: HOME HEALTH | Age: 87
End: 2022-10-17
Payer: MEDICARE

## 2022-10-17 PROCEDURE — 0651 HSPC ROUTINE HOME CARE

## 2022-10-17 PROCEDURE — G0156 HHCP-SVS OF AIDE,EA 15 MIN: HCPCS

## 2022-10-18 PROCEDURE — 0651 HSPC ROUTINE HOME CARE

## 2022-10-19 PROCEDURE — 0651 HSPC ROUTINE HOME CARE

## 2022-10-20 PROCEDURE — 0651 HSPC ROUTINE HOME CARE

## 2022-10-20 PROCEDURE — HOSPICE MEDICATION HC HH HOSPICE MEDICATION

## 2022-10-21 ENCOUNTER — HOME CARE VISIT (OUTPATIENT)
Dept: SCHEDULING | Facility: HOME HEALTH | Age: 87
End: 2022-10-21
Payer: MEDICARE

## 2022-10-21 VITALS
SYSTOLIC BLOOD PRESSURE: 138 MMHG | RESPIRATION RATE: 18 BRPM | DIASTOLIC BLOOD PRESSURE: 80 MMHG | HEART RATE: 76 BPM | TEMPERATURE: 98 F

## 2022-10-21 PROCEDURE — 0651 HSPC ROUTINE HOME CARE

## 2022-10-21 PROCEDURE — G0299 HHS/HOSPICE OF RN EA 15 MIN: HCPCS

## 2022-10-21 NOTE — HOSPICE
Patient up in chair in her room. PO2 tubing and humidification changed. Lungs are clear and diminished in the bases. Respirations even and nonlabored. Bowel sound active. No lower extremity edema and pedal pulses present. NO skin breakdown noted. Palms reddendo as patient has hands in fist like position most of the time. Nails were filed and clipped to hands to prevent palm breakdown. Washcloths placed in hands. She went to music event at end of visit. Laughing at times. No pain noted. Son called and updated. Told to call with questions, concerns and changes in status.

## 2022-10-22 PROCEDURE — 0651 HSPC ROUTINE HOME CARE

## 2022-10-23 PROCEDURE — 0651 HSPC ROUTINE HOME CARE

## 2022-10-24 PROCEDURE — 0651 HSPC ROUTINE HOME CARE

## 2022-10-25 PROCEDURE — 0651 HSPC ROUTINE HOME CARE

## 2022-10-26 PROCEDURE — 0651 HSPC ROUTINE HOME CARE

## 2022-10-27 PROCEDURE — 0651 HSPC ROUTINE HOME CARE

## 2022-10-27 NOTE — HOSPICE
Patient up in bed sleeping. Napping on and off during assessment. Legs and hands have increase in rigidity and legs are no long able to bed for foot rests on wheelchair. . Lungs are clear, diminished in the bases. Respirations even and nonlabored. o2 intact via nasal canula. Bowel sounds active and abdomen nondistended and not tender. No lower extremity edema and pedal pulses intact. Med rec competed. Told to call hospice with questions, concerns and changes in status. Son updated.

## 2022-10-28 PROCEDURE — 0651 HSPC ROUTINE HOME CARE

## 2022-10-29 PROCEDURE — 0651 HSPC ROUTINE HOME CARE

## 2022-10-30 PROCEDURE — 0651 HSPC ROUTINE HOME CARE

## 2022-10-31 PROCEDURE — 0651 HSPC ROUTINE HOME CARE

## 2022-11-01 PROCEDURE — 0651 HSPC ROUTINE HOME CARE

## 2022-11-02 PROCEDURE — 0651 HSPC ROUTINE HOME CARE

## 2022-11-02 PROCEDURE — HOSPICE MEDICATION HC HH HOSPICE MEDICATION

## 2022-11-02 NOTE — HOSPICE
Arrived at patients room and she was watching tv. Appetite is unchanged. Max assist with all ADL's  Hands are contracted. Nail care performed. Complaints were none. Respirations were unlabored. Lungs clear and diminished in bases. Asked nursing staff to fill humidification for o2 concentrator. Bowel sounds are active. No edema and pulses palpable. No nonverbal signs of pain, shortness of breath or agitation. Patient has a discharge from vaginal area with an odor that has become stronger. Called Gallito Foster NP and flagyl ordered as this has been used in the past with good reslults. Oder given to nursing staff. Skin well moisturized. Patient continues to have low energy and expressive aphasia although she can responds to name. Medication reconciled with caregiver. Education related to medication administration performed and reinforced. Told to contact hospice with questions, concerns or change in status. Son updated. Sunita shared with son that he will hve a new  and that it was a pleasure working with their family.

## 2022-11-03 PROCEDURE — 0651 HSPC ROUTINE HOME CARE

## 2022-11-04 PROCEDURE — 0651 HSPC ROUTINE HOME CARE

## 2022-11-05 PROCEDURE — 0651 HSPC ROUTINE HOME CARE

## 2022-11-05 PROCEDURE — 3331090004 HSPC SERVICE INTENSITY ADD-ON

## 2022-11-06 ENCOUNTER — HOME CARE VISIT (OUTPATIENT)
Dept: HOSPICE | Facility: HOSPICE | Age: 87
End: 2022-11-06
Payer: MEDICARE

## 2022-11-06 NOTE — HOSPICE
Tressa Perez passed away at [facility] this evening. She was pronounced at 740pm.  Facility called Hui Sandhu, her son . He is not visiting the body tonight. [facility] care provided. [facility] called. Doctor on call, Dr Shahida Canchola and Dr. Ela Wakefield notified  by this email. Patient did not have any rented equipment.

## 2022-11-07 ENCOUNTER — HOME CARE VISIT (OUTPATIENT)
Dept: HOSPICE | Facility: HOSPICE | Age: 87
End: 2022-11-07
Payer: MEDICARE